# Patient Record
Sex: FEMALE
[De-identification: names, ages, dates, MRNs, and addresses within clinical notes are randomized per-mention and may not be internally consistent; named-entity substitution may affect disease eponyms.]

---

## 2019-02-19 ENCOUNTER — HOSPITAL ENCOUNTER (OUTPATIENT)
Dept: HOSPITAL 88 - OR | Age: 64
Discharge: HOME | End: 2019-02-19
Attending: OPHTHALMOLOGY
Payer: MEDICARE

## 2019-02-19 VITALS — DIASTOLIC BLOOD PRESSURE: 64 MMHG | SYSTOLIC BLOOD PRESSURE: 120 MMHG

## 2019-02-19 DIAGNOSIS — Z79.82: ICD-10-CM

## 2019-02-19 DIAGNOSIS — E78.5: ICD-10-CM

## 2019-02-19 DIAGNOSIS — E11.9: ICD-10-CM

## 2019-02-19 DIAGNOSIS — Z95.5: ICD-10-CM

## 2019-02-19 DIAGNOSIS — Z88.6: ICD-10-CM

## 2019-02-19 DIAGNOSIS — Z79.84: ICD-10-CM

## 2019-02-19 DIAGNOSIS — I25.10: ICD-10-CM

## 2019-02-19 DIAGNOSIS — H25.11: Primary | ICD-10-CM

## 2019-02-19 DIAGNOSIS — I10: ICD-10-CM

## 2019-02-19 PROCEDURE — 66984 XCAPSL CTRC RMVL W/O ECP: CPT

## 2019-02-19 PROCEDURE — 82948 REAGENT STRIP/BLOOD GLUCOSE: CPT

## 2019-02-19 PROCEDURE — 36415 COLL VENOUS BLD VENIPUNCTURE: CPT

## 2019-02-19 NOTE — XMS REPORT
Encounter Summary

                             Created on: 2019



Dipti Law

External Reference #: [id number]

: 1955

Sex: Female



Demographics







                          Address                   [address] [place]  [address]

 

                          Home Phone                [phone]

 

                          Preferred Language        English

 

                          Marital Status            

 

                          Christian Affiliation     Unknown

 

                          Race                      Unknown

 

                          Ethnic Group              /Latin





Author







                          Organization              Unknown

 

                          Address                   [address] [place]  [address]



 

                          Phone                     [phone]







Care Team Providers







                    Care Team Member Name    Role                Phone

 

                    Dr. Ute Jackson    3                   [phone]

 

                    Ute Jackson MD    [phone]                   [phone]

 

                    Best Gaspar MD    [phone]                  [phone]

 

                    Ankit Snyder MD    110                 [phone]

 

                    Surendra Mckeon MD    111                 [phone]

 

                    Surendra Jolly DPM    120                 [phone]

 

                    Blair Pandey MD    129                 [phone]



                                                      



Reason for Visit

          





                                        diabetic foot exam; pre-op evaluation



                                                                    



Instructions

          





                                        1. Pre-surgery evaluation

 

                                         electrocardiogram

 

                                         CBC w/ auto diff

 

                                        2. Depression screening

 

                                         learning about depression

 

                                        3. Screening mammography

 

                                         MAMMO, screening, digital, bilateral - PLEASE SCHEDULE & CONTACT OUR PATIENT. 

THANK YOU

 

                                         mammogram: about this test

 

                                        4. Screening for malignant neoplasm of colon

 

                                         fecal occult blood, stool

 

                                         colonoscopy referral - Please schedule & contact our patient. thank you/FX RESULTS

 [phone] Thank you

 

                                        5. Angina pectoris

 

                                        6. Peripheral vascular disease

 

                                        7. Type 2 diabetes mellitus

 

                                        8. Depressive disorder







Discussion Note: None recorded.                                                 
                                                



Plan of Care

                      





                Reminders                                                                    Provider 

               

 

                Appointments    Est Patient     2019 8:15AM    Shaw Roberson MD

 

                Lab             Fecal Occult Blood, Stool    2019      [facility]

 

                               CBC W/ Auto Diff    2019      [facility]

 

                Referral        Colonoscopy Referral    2019      Carlos Montemayor MD

 

                Procedures      None recorded.                   

 

                Surgeries       None recorded.                   

 

                Imaging         MAMMO, Screening, Digital, Bilateral    2019      [facility]

 

                               Electrocardiogram    2019      [facility] [place]



                                                                                
                                                         



Medications

                      





                    Name                      Start Date                                      

 

                                        amlodipine 10 mg tablet

 Take 1 tablet every day by oral route.    

 

                                        atorvastatin 10 mg tablet

 TAKE 1 TABLET BY MOUTH EVERY DAY       

 

                                        calcium

 TAKE ONE TABLET BY MOUTH ONCE A DAY    

 

                                        Longs Adult Low Strength ASA 81 mg tablet,delayed release

 Take 1 tablet every day by oral route.    

 

                                        metformin 500 mg tablet

 TAKE 1 TABLET BY MOUTH TWICE DAILY     

 

                                        metoprolol succinate ER 50 mg tablet,extended release 24 hr

 Take 1 tablet every day by oral route.    



                                                                                
                                                         



Medications Administered

          



  None recorded.                                                                
               



Vitals

          





                Height          Weight          BMI             Blood Pressure 

 

                 5 ft 1 in        158.6 lbs        30 kg/m2        136/70 mm[Hg]  



                                                                              



Lab Results

                      



              None recorded.                                                    
                                                



Allergies

          





          Code      Code System    Name      Reaction    Severity    Status    Onset

 

          2670      RxNorm    Codeine                      Active    05/15/2018



                                                                              



Problems

                      





                Name                      Status                      Onset Date                      

Source                                                  

 

                Dural Arteriovenous Malformation    Active          05/15/2018      External

 

                Type 2 Diabetes Mellitus    Active          2018      External

 

                Essential Hypertension    Active          2018      

 

                Depressive Disorder    Active          2018      

 

                History of Cerebrovascular Accident    Active          2018      



                                                                                
                                                



Procedures

                      





                Date                      Name                      Performed by                      

                

 

                    2011          Knee Surgery        Information not available

 

                    1986          Partial Hysterectomy    Information not available

 

                                       Other               Information not available

 

                                       Tonsilectomy/adenoids    Information not available

 

                    2019          MAMMO, Screening, Digital, Bilateral    [facility]

                                        [address] 

[place] [address] [phone] (Work Place)

 

                    2019          Electrocardiogram    [facility] [address] [address] [phone] ([facility] Place)



                                                                                
                                                          



Vaccine List

          





                                        Vaccine Type

 

                                        influenza, high dose seasonal

 

                                        10/30/80613.5 mL

 

                                        pneumococcal conjugate PCV 13

 

                                        10/30/48963.5 mL



                                                                                
        



Social History

          





                    Smoking Status      Never Smoker         



                                                                              



Past Encounters

                      





                                        2019

Pre-surgery Evaluation; Depression Screening; Screening Mammography; Screening 
for Malignant Neoplasm of Colon; Angina Pectoris; Peripheral Vascular Disease; 
Type 2 Diabetes Mellitus; Depressive Disorder

Shaw Roberson MD: [address] [place] [address], Ph. 
[phone]



                                                                                
        



History of Present Illness

          



Note:Coming for preoperative evaluation for bilateral cataract surgery. Hx of DM
 last a1c 6.7 (stable). No new concerns.                                        
                            



Review of Systems

                      





                                                   Comprehensive General Adult ROS

 

                          Reported By:              Patient

 

                          Constitutional:           Constitutional: no fever, no night sweats, no significant weight

 gain, no significant weight loss, no exercise intolerance

 

                          Eyes:                     Eyes: no dry eyes, no irritation, vision change

 

                          ENMT:                     Ears: no difficulty hearing, no ear pain. Nose: no frequent nosebleeds, no

 nose problems, no sinus problems. Mouth/Throat: no sore throat, no bleeding 
gums, no snoring, no dry mouth, no mouth ulcers, no oral abnormalities, no teeth
 problems

 

                          Cardiovascular:           Cardiovascular: no chest pain, no arm pain on exertion, no shortness

 of breath when walking, no shortness of breath when lying down, no 
palpitations, no known heart murmur, no lightheadedness

 

                          Respiratory:              Respiratory: no cough, no wheezing, no shortness of breath, no coughing

 up blood, no sleep apnea

 

                          Gastrointestinal:         Gastrointestinal: no abdominal pain, no nausea, no vomiting, 

no constipation, normal appetite, no diarrhea, not vomiting blood, no dyspepsia,
 no GERD

 

                          Genitourinary:            Genitourinary: no incontinence, no difficulty urinating, no hematuria,

 no increased frequency

 

                          Musculoskeletal:          Musculoskeletal: no muscle aches, no muscle weakness, no arthralgias/joint

 pain, no back pain, no swelling in the extremities

 

                          Integumentary:            Skin: no abnormal mole, no jaundice, no rashes, no laceration

 

                          Neurologic:               Neurologic: no loss of consciousness, no weakness, no numbness, no 

seizures, no dizziness, no migraines, no headaches, no tremor

 

                          Psychiatric:              Psych: no depression, no sleep disturbances, feeling safe in a relationship,

 no alcohol abuse, no anxiety, no hallucinations, no suicidal thoughts

 

                          Endocrine:                Endocrine: no fatigue

 

                          Hematologic/Lymphatic:    Hematologic/Lymphatic no swollen glands, no bruising, no

 excessive bleeding

 

                          Allergic/Immunologic:     Allergy/Immunologic: no runny nose, no sinus pressure, no

 itching, no hives, no frequent sneezing



                                                                              



Physical Exam

                      





                                                   General Adult Exam (male)

 

                          Reported By:              Patient

 

                          Constitutional:           General Appearance: healthy-appearing, obese. Level of Distress:

 NAD. Ambulation: ambulating normally

 

                          Psychiatric:              Insight: good judgement. Mental Status: active and alert, normal mood,

 normal affect. Orientation: to time, to place, to person. Memory: recent memory
 normal, remote memory normal

 

                          Head:                     Head: normocephalic, atraumatic

 

                          Eyes:                     Lids and Conjunctivae: non-injected, no discharge. Pupils: PERRLA. EOM: EOMI.

 Sclerae: non-icteric

 

                          ENMT:                     Ears: TMs clear. Nose: no sinus tenderness. Lips, Teeth, and Gums: no mouth

 or lip ulcers. Oropharynx: moist mucous membranes

 

                          Neck:                     Neck: supple, trachea midline. Thyroid: no enlargement, non-tender

 

                          Lungs:                    Auscultation: breath sounds normal

 

                          Cardiovascular:           Heart Auscultation: RRR, normal S1, normal S2, no murmurs. Neck

 vessels: no carotid bruits. Pulses including femoral / pedal: normal throughout

 

                          Neurologic:               Gait and Station: normal gait. Cranial Nerves: grossly intact

 

                          Skin:                     Inspection and palpation: no rash, no lesions

## 2019-02-19 NOTE — XMS REPORT
Clinical Summary

                             Created on: 2019



Dipti Law

External Reference #: [id number]

: 1955

Sex: Female



Demographics







                          Address                   [address] [place]  [address]

 

                          Home Phone                [phone]

 

                          Preferred Language        English

 

                          Marital Status            

 

                          Druze Affiliation     [id number]

 

                          Race                      White

 

                          Ethnic Group              /Latin





Author







                          Author                    [facility]

 

                          Organization              [facility]

 

                          Address                   Unknown

 

                          Phone                     Unavailable







Support







                Name            Relationship    Address         Phone

 

                    Hitesh Law        [address]                [address] [address]

[place]  [address]                      [phone]







Care Team Providers







                    Care Team Member Name    Role                Phone

 

                    MiltonMarcos    PCP                 [phone]







Allergies







                                        Comments



                 Active Allergy     Reactions       Severity        Noted Date 

 

                                        



Heart beat changes



                           Codeine                   05/15/2018 







Medications







                          End Date                  Status



              Medication     Sig          Dispensed     Refills      Start  



                                         Date  

 

                                                    Active



                     amLODIPine (NORVASC) 10     amlodipine 10       0   



                           mg tablet                 mg tablet     



                                         Take 1     



                                         tablet every     



                                         day by oral     



                                         route.     

 

                                                    Active



                     atorvastatin (LIPITOR) 10     atorvastatin        0   



                           MG tablet                 10 mg tablet     



                                         TAKE 1     



                                         TABLET BY     



                                         MOUTH EVERY     



                                         DAY     

 

                                                    Active



                     calcium carbonate     TAKE ONE            0   



                           (CALCIUM 500 ORAL)        TABLET BY     



                                         MOUTH ONCE A     



                                         DAY     

 

                                                    Active



                     metoprolol succinate XL     Take 1 tablet       0   



                           (TOPROL-XL) 50 mg 24 hr     every day by     



                           tablet                    oral route.     

 

                                                    Active



                     metFORMIN (GLUCOPHAGE)     metformin 500       0   



                           500 mg tablet             mg tablet     



                                         TAKE 1     



                                         TABLET BY     



                                         MOUTH TWICE     



                                         DAILY     

 

                          05/15/2018                Discontinued



                     amLODIPine (NORVASC) 10     Take 1 tablet       0   



                           mg tablet                 every day by     



                                         oral route.     

 

                          05/15/2018                Discontinued



                     atorvastatin (LIPITOR) 10     TAKE 1 TABLET       0   



                           MG tablet                 BY MOUTH     



                                         EVERY DAY     

 

                          05/15/2018                Discontinued



                     calcium carbonate     calcium             0   



                           (CALCIUM 500 ORAL)        TAKE ONE     



                                         TABLET BY     



                                         MOUTH ONCE A     



                                         DAY     

 

                          05/15/2018                Discontinued



                     metoprolol succinate XL     metoprolol          0   



                           (TOPROL-XL) 50 mg 24 hr     succinate ER     



                           tablet                    50 mg     



                                         tablet,extend     



                                         ed release 24     



                                         hr     



                                         Take 1     



                                         tablet every     



                                         day by oral     



                                         route.     

 

                          05/15/2018                Discontinued



                     metFORMIN (GLUCOPHAGE)     TAKE 1 TABLET       0   



                           500 mg tablet             BY MOUTH     



                                         TWICE DAILY     

 

                          2018                



              methylPREDNISolone     follow       21 tablet     0              



                     (MEDROL, TOM,) 4 mg     package             8  



                           tablet                    directions     







Active Problems







 



                           Problem                   Noted Date

 

 



                           Type 2 diabetes mellitus with hyperglycemia, without long-term current use     2018





                                         of insulin 

 

 



                           Essential hypertension     2018

 

 



                           Dural arteriovenous fistula     05/15/2018







Encounters







                          Care Team                 Description



                     Date                Type                Specialty  

 

                                        



Sotero Man MD Bhardwaj, Sergio Mccloud MD                 Dural arteriovenous fistula (Primary Dx); 

Arteriovenous fistula, acquired; 

Type 2 diabetes mellitus with hyperglycemia, without long-term current use of 
insulin



                     05/15/2018          Hospital            Neurosurgery  



                           -                         Encounter   



                                         2018    

 

                                        



Brice Dalton MD                C-C fistula



                     05/15/2018          Hospital            Radiology  



                                         Encounter   

 

                                        



Janna Abbott MD                    



                     05/15/2018          Anesthesia          Radiology  



                                         Event   

 

                                        



Sotero Man MD               Arteriovenous fistula, acquired (Primary Dx)



                     05/15/2018          Transcribe          Radiology  



                                         Orders   

 

                                        



Anita Piper MD                      



                     2018          Anesthesia          Radiology  



                                         Event   

 

                                        



Brice Dalton MD                Carotid-cavernous fistula



                     2018          Hospital            Radiology  



                                         Encounter   

 

                                        



Brice Dalton MD                Carotid-cavernous fistula (Primary Dx)



                     2018          Transcribe          Radiology  



                                         Orders   

 

                                        



Patrick Grimes, DO                      Pain in right eye



                     2018          Hospital            Radiology  



                                         Encounter   

 

                                        



Patrick Grimes, DO                      Pain in right eye (Primary Dx)



                     2018          Transcribe          Access  



                                         Orders   



after 2018



Social History







                                        Date



                 Tobacco Use     Types           Packs/Day       Years Used 

 

                                         



                                         Never Smoker    

 

    



                                         Smokeless Tobacco: Never   



                                         Used   









   



                 Alcohol Use     Drinks/Week     oz/Week         Comments

 

   



                           Yes                       occasionally









 



                           Sex Assigned at Birth     Date Recorded

 

 



                                         Not on file 









                                        Industry



                           Job Start Date            Occupation 

 

                                        Not on file



                           Not on file               Not on file 









                                        Travel End



                           Travel History            Travel Start 

 





                                         No recent travel history available.







Last Filed Vital Signs







                                        Time Taken



                           Vital Sign                Reading 

 

                                        2018 12:39 PM CDT



                           Blood Pressure            156/69 

 

                                        2018 12:39 PM CDT



                           Pulse                     58 

 

                                        2018 12:39 PM CDT



                           Temperature               36.4 C (97.6 F) 

 

                                        2018 12:39 PM CDT



                           Respiratory Rate          18 

 

                                        2018 12:39 PM CDT



                           Oxygen Saturation         95% 

 

                                        -



                           Inhaled Oxygen            - 



                                         Concentration  

 

                                        05/15/2018 11:22 AM CDT



                           Weight                    76.2 kg (168 lb) 

 

                                        05/15/2018 11:22 AM CDT



                           Height                    154.9 cm (5' 1") 

 

                                        05/15/2018 11:22 AM CDT



                           Body Mass Index           31.74 







Plan of Treatment







   



                 Health Maintenance     Due Date        Last Done       Comments

 

   



                           DIABETIC RETINAL EYE EXAM     1955  

 

   



                           DIABETIC FOOT EXAM        1963  

 

   



                           URINE MICROALBUMIN        1963  

 

   



                           CERVICAL CANCER SCREENING     1974  

 

   



                           BREAST CANCER SCREENING     2003  

 

   



                           COLON CANCER SCREENING     2003  

 

   



                           SHINGLES VACCINES (1 of     2003  



                                         2)   

 

   



                           PNEUMOCOCCAL              2018  



                                         POLYSACCHARIDE VACCINE   



                                         AGE 65 AND OVER   

 

   



                           PNEUMOCOCCAL-13           2018  

 

   



                           INFLUENZA VACCINE         2018  







Implants







                    Device Identifier    Shelf Expiration Date    Model / Serial / Lot



                 Implanted       Type            Area            Manufactur   



                                         er   

 

                                        2019          [id number] /

 /

                                        [id number]



                     Device Vasclr Clsr Vasoactive     Cardiovasc          N/A: N/A    



                           Intstnl Peptd 6fr Angio-Seal -     ular     



                           [id number]                Implants     



                                         Implanted: 2018 (Quantity not      



                                         on file)      

 

                                        2019          [id number] /

 /

                                        [id number]



                     Device Vasclr Clsr Vasoactive     Cardiovasc          N/A: N/A    



                           Intstnl Peptd 6fr Angio-Seal -     ular     



                           [id number]                Implants     



                                         Implanted: 05/15/2018 (Quantity not      



                                         on file)      

 

                                                            [id number] /

 /





                 Catheter Angio Imager Ii 5fr 100cm     Surgical        N/A: N/A        [facility] -     Implants;           PERIPHERAL   



                     [id number]          Expanders;          INTERVENTI   



                     Implanted: 2018 (Quantity not     Extenders;          ON   



                     on file)            Surgical            VASCULAR   



                           Wires                     RUBIN   

 

                                                            [id number] /

 /





                 Catheter Thrmbtmy Neuron Max 088     Surgical        N/A: N/A        PENUMBRA   



                     Str 6fr 80x4cm - [id number]     Implants;           INC   



                           Implanted: 05/15/2018 (Quantity not     Expanders;     



                           on file)                  Extenders;     



                                         Surgical     



                                         Wires     

 

                                                            [id number] /

 /





                 Catheter Angiography Diag Barnh     Surgical        N/A: N/A        [facility]   



                     Torque Imager Ii 5fr 100cm -     Implants;           PERIPHERAL   



                     [id number]          Expanders;          INTERVENTI   



                     Implanted: 05/15/2018 (Quantity not     Extenders;          ON   



                     on file)            Surgical            VASCULAR   



                           Wires                     RUBIN   

 

                                                            [id number] /

 /





                 Catheter Neurvas Gassaway 10 1lmn     Surgical        N/A: N/A        [employer]   



                     Renfrcd 2.1-1.7fr Micr - [id number]     Implants;           [place] -   



                     Implanted: 05/15/2018 (Quantity not     Expanders;          VASCULAR   



                           on file)                  Extenders;     



                                         Surgical     



                                         Wires     

 

                                                            FC 12 30 3D /

 /





                 Coil Axium [id number] Detachable     Vascular        N/A: N/A        [employer]   



                     (Frame) 12 X 30 - [id number]     Coil                [place] -   



                           Implanted: 05/15/2018 (Quantity not       VASCULAR   



                                         on file)      

 

                                                            FC 10 40 3D /

 /





                 Coil Axium [id number] Detachable     Vascular        N/A: N/A        [employer]   



                     (Frame) 10 X 40 - [id number]     Coil                [place] -   



                           Implanted: 05/15/2018 (Quantity not       VASCULAR   



                                         on file)      







Procedures







                                        Comments



                 Procedure Name     Priority        Date/Time       Associated Diagnosis 

 

                                        



Results for this procedure are in the results section.



                     POC GLUCOSE         Routine             2018  



                                         1:44 PM CDT  

 

                                        



Results for this procedure are in the results section.



                     POC GLUCOSE         Routine             2018  



                                         7:39 AM CDT  

 

                                        



Results for this procedure are in the results section.



                     POC GLUCOSE         Routine             2018  



                                         12:13 AM CDT  

 

                                        



Results for this procedure are in the results section.



                     POC GLUCOSE         Routine             2018  



                                         7:33 PM CDT  

 

                                        



Results for this procedure are in the results section.



                     POC GLUCOSE         Routine             2018  



                                         3:38 PM CDT  

 

                                        



Results for this procedure are in the results section.



                     POC GLUCOSE         Routine             2018  



                                         11:34 AM CDT  

 

                                        



Results for this procedure are in the results section.



                     POC GLUCOSE         Routine             2018  



                                         7:25 AM CDT  

 

                                        



Results for this procedure are in the results section.



                     POC GLUCOSE         Routine             2018  



                                         4:35 AM CDT  

 

                                        



Results for this procedure are in the results section.



                     ZZESTIMATED GFR     Routine             2018  



                                         12:44 AM CDT  

 

                                        



Results for this procedure are in the results section.



                     IONIZED CALCIUM     Routine             2018  



                                         12:44 AM CDT  

 

                                        



Results for this procedure are in the results section.



                     BASIC METABOLIC PANEL     Routine             2018  



                                         12:44 AM CDT  

 

                                        



Results for this procedure are in the results section.



                     PHOSPHORUS LEVEL     Routine             2018  



                                         12:44 AM CDT  

 

                                        



Results for this procedure are in the results section.



                     MAGNESIUM LEVEL     Routine             2018  



                                         12:44 AM CDT  

 

                                        



Results for this procedure are in the results section.



                     HC COMPLETE BLD COUNT     Routine             2018  



                           W/AUTO DIFF               12:35 AM CDT  

 

                                        



Results for this procedure are in the results section.



                     POC GLUCOSE         Routine             2018  



                                         12:04 AM CDT  

 

                                        



Results for this procedure are in the results section.



                     POC GLUCOSE         Routine             05/15/2018  



                                         10:35 PM CDT  

 

                                        



Results for this procedure are in the results section.



                     POC GLUCOSE         Routine             05/15/2018  



                                         8:53 PM CDT  

 

                                        



Results for this procedure are in the results section.



                 IR ANGIOGRAM CEREBRAL     Routine         05/15/2018      C-C fistula 



                           RIGHT                     4:41 PM CDT  

 

                                        



Results for this procedure are in the results section.



                     IR 3D RECON SLICES     Routine             05/15/2018  



                           SNAPSHOTS RDMPS           4:40 PM CDT  

 

                                        



Results for this procedure are in the results section.



                 IR EMBOLIZATION ARTERIAL     Routine         05/15/2018      Arteriovenous fistula, 



                     NEURO               4:40 PM CDT         acquired 

 

                                         



                     ARTERIAL LINE       Routine             05/15/2018  



                                         1:30 PM CDT  

 

  



                                         Procedure



                                         Note -



                                         Janna Abbott MD -



                                         05/15/2018



                                         1:30 PM



                                         CDT



                                         Arterial



                                         line



                                         Performed



                                         by:



                                         JANNA ABBOTT



                                         Authorized



                                         by:



                                         JANNA ABBOTT





                                         Patient



                                         Location:



                                         OR



                                         Start



                                         Time:



                                         5/15/2018



                                         1:30 PM



                                         End Time:



                                         5/15/2018



                                         1:30 PM



                                         Staff:



                                         Kodio



                                         shana:



                                         JANNA ABBOTT



                                         Performed



                                         by:



                                         Kandis saldana



                                         Pre-proced



                                         ure:



                                         patient



                                         identified



                                         , IV



                                         checked,



                                         site and



                                         side



                                         verified,



                                         risks and



                                         benefits



                                         discussed,



                                         procedure



                                         verified,



                                         surgical



                                         consent



                                         complete,



                                         patient



                                         position



                                         confirmed,



                                         monitors



                                         and



                                         equipment



                                         checked



                                         and pre-op



                                         evaluation



                                         complete



                                         MSBT:



                                         antiseptic



                                         used, all



                                         elements



                                         of maximal



                                         sterile



                                         barrier



                                         technique



                                         followed,



                                         hand



                                         hygiene



                                         performed,



                                         cap/gown



                                         used by



                                         other



                                         personnel



                                         and



                                         solutions



                                         labeled



                                         Indication



                                         s:



                                         Indication



                                         s:



                                         hemodynami



                                         c



                                         monitoring



                                         Anesthesia



                                         :



                                         Anesthesia



                                         :  General



                                         Procedure



                                         Details:



                                         Arterial



                                         Line



                                         placement:



                                         Placed



                                         post



                                         induction



                                         Line



                                         placement



                                         site:



                                         Radial



                                         Line



                                         placement



                                         side:



                                         Left



                                         Arterial



                                         line



                                         gauge:  20



                                         G



                                         Number of



                                         attempts:



                                         1



                                         Ultrasound



                                         guidance



                                         used: No



                                         Post-proce



                                         dure:



                                         Post-proce



                                         dure:



                                         Sterile



                                         dressing



                                         applied



                                         Post



                                         procedure



                                         circulatio



                                         n,



                                         sensation,



                                         movement:



                                         Normal



                                         Patient



                                         tolerance:



                                         Patient



                                         tolerated



                                         the



                                         procedure



                                         well with



                                         no



                                         immediate



                                         complicati



                                         ons



 

                                         



                     IL AN ELECTIVE      Routine             05/15/2018  



                           ENDOTRACHEAL AIRWAY       1:29 PM CDT  

 

  



                                         Procedure



                                         Note -



                                         Janna Abbott MD -



                                         05/15/2018



                                         1:29 PM



                                         CDT



                                         Airway



                                         Date/Time:



                                         5/15/2018



                                         1:29 PM



                                         Performed



                                         by:



                                         JANNA ABBOTT



                                         Authorized



                                         by:



                                         JANNA ABBOTT





                                         Location:



                                         OR



                                         Urgency:



                                         Elective



                                         Difficult



                                         Airway: No



                                         Anesthesio



                                         logist:



                                         JANNA ABBOTT



                                         Performed



                                         by:



                                         anesthesiodalis saldana



                                         Preoxygena



                                         amna with



                                         100% O2:



                                         Yes



                                         C-spine



                                         Precaution



                                         s



                                         Maintained



                                         Throughout



                                         : Yes



                                         Mask



                                         Ventilatio



                                         n:  Easy



                                         mask



                                         Final



                                         Airway



                                         Type:



                                         Endotrache



                                         al airway



                                         Final



                                         Endotrache



                                         al Airway:



                                         ETT



                                         Cuffed:



                                         Yes



                                         Technique



                                         Used:



                                         Direct



                                         laryngosco



                                         py



                                         Devices/Me



                                         thods Used



                                         in



                                         Placement:



                                         Intubatin



                                         g stylet



                                         Insertion



                                         Site:



                                         Oral



                                         Blade



                                         Type:



                                         Coto



                                         Laryngosco



                                         pe



                                         Blade/Vide



                                         olaryngosc



                                         ope Blade



                                         Size:  2



                                         ETT Size



                                         (mm):  7.0



                                         Cuff at



                                         minimum



                                         occlusion



                                         pressure:



                                         Yes



                                         Measured



                                         from:



                                         Teeth



                                         ETT to



                                         Teeth



                                         (cm):  21



                                         Placement



                                         Verified



                                         by: CO2



                                         detection,



                                         direct



                                         visualizat



                                         ion and



                                         equal



                                         breath



                                         sounds



                                         Laryngosco



                                         pic view:



                                         Grade I -



                                         full view



                                         of glottis



                                         Rapid



                                         Sequence



                                         Induction



                                         (RSI): No





                                         Modified



                                         RSI: No



                                         Number of



                                         Attempts



                                         at



                                         Approach:



                                         1



                                         TOOTH



                                         PROTECTOR.



                                         DENTITION



                                         UNCHANGED.



 

                                        



Results for this procedure are in the results section.



                     POC GLUCOSE         Routine             05/15/2018  



                                         10:46 AM CDT  

 

                                        



Results for this procedure are in the results section.



                     POC GLUCOSE         Routine             2018  



                                         1:02 PM CDT  

 

                                        



Results for this procedure are in the results section.



                     IR 3D RECON SLICES     Routine             2018  



                           SNAPSHOTS RDMPS           12:55 PM CDT  

 

                                        



Results for this procedure are in the results section.



                 IR ANGIOGRAM CEREBRAL     Routine         2018      Carotid-cavernous fistula 



                           BILATERAL                 12:55 PM CDT  

 

                                         



                     IL AN ELECTIVE      Routine             2018  



                           ENDOTRACHEAL AIRWAY       11:35 AM CDT  

 

  



                                         Procedure



                                         Note -



                                         Anita Piper MD -



                                         2018



                                         11:35 AM



                                         CDT



                                         Airway



                                         Date/Time:



                                         2018



                                         11:26 AM



                                         Performed



                                         by: ANITA PIPER



                                         Authorized



                                         by: ANITA PIPER





                                         Location:



                                         OR



                                         Urgency:



                                         Elective



                                         Difficult



                                         Airway: No



                                         Preoxygena



                                         amna with



                                         100% O2:



                                         Yes



                                         C-spine



                                         Precaution



                                         s



                                         Maintained



                                         Throughout



                                         : Yes



                                         Mask



                                         Ventilatio



                                         n:



                                         Assisted



                                         mask



                                         Final



                                         Airway



                                         Type:



                                         Endotrache



                                         al airway



                                         Final



                                         Endotrache



                                         al Airway:



                                         ETT



                                         Cuffed:



                                         Yes



                                         Technique



                                         Used:



                                         Direct



                                         laryngosco



                                         py



                                         Devices/Me



                                         thods Used



                                         in



                                         Placement:



                                         Intubatin



                                         g stylet



                                         Insertion



                                         Site:



                                         Oral



                                         Blade



                                         Type:



                                         Coto



                                         Laryngosco



                                         pe



                                         Blade/Vide



                                         olaryngosc



                                         ope Blade



                                         Size:  2



                                         ETT Size



                                         (mm):  7.0



                                         Cuff at



                                         minimum



                                         occlusion



                                         pressure:



                                         Yes



                                         Measured



                                         from:



                                         Lips



                                         ETT to



                                         Lips (cm):



                                         23



                                         Placement



                                         Verified



                                         by: CO2



                                         detection,



                                         direct



                                         visualizat



                                         ion and



                                         equal



                                         breath



                                         sounds



                                         Laryngosco



                                         pic view:



                                         Grade IIa



                                         - partial



                                         view of



                                         glottis



                                         Rapid



                                         Sequence



                                         Induction



                                         (RSI): No





                                         Modified



                                         RSI: No



                                         Number of



                                         Attempts



                                         at



                                         Approach:



                                         1



 

                                        



Results for this procedure are in the results section.



                     POC GLUCOSE         Routine             2018  



                                         9:17 AM CDT  

 

                                        



Results for this procedure are in the results section.



                 CT ANGIOGRAM HEAD W WO     Routine         2018      Pain in right eye 



                           CONTRAST                  6:00 PM CDT  

 

                                        



Results for this procedure are in the results section.



                     POC CREATININE      Routine             2018  



                                         5:33 PM CDT  



after 2018



Results

* POC glucose (2018  1:44 PM CDT)



Only the most recent of 14 results within the time period is included.





   



                 Component       Value           Ref Range       Performed At

 

   



                 POC glucose     152 (H)         65 - 99 mg/dL     [facility] DEPARTMENT OF



                           Comment:                  PATHOLOGY AND



                           [facility] Notified RN           GENOMIC MEDICINE



                                         Meter ID: [id number]  



                                         : María Elena Sauer  









   



                 Performing Organization     Address         [facility]     Phone Number

 

   



                     [facility] [place] [address] 



                                         PATHOLOGY AND GENOMIC   



                                         MEDICINE   





* Estimated GFR (2018 12:44 AM CDT)





   



                 Component       Value           Ref Range       Performed At

 

   



                 GFR Non Af Amer     >90             mL/min/1.73 m2     [facility] DEPARTMENT OF



                                         PATHOLOGY AND



                                         GENOMIC MEDICINE

 

   



                 GFR Af Amer     >90             mL/min/1.73 m2     [facility] DEPARTMENT OF



                           Comment:                  PATHOLOGY AND



                           Chronic kidney disease: <60      GENOMIC MEDICINE



                                         mL/min/1.73m2  



                                         Kidney failure: <15  



                                         mL/min/1.73m2  



                                         The estimated GFR is  



                                         calculated from the  



                                         IDMS-traceable Modification of  



                                         Diet  



                                         in Renal Disease Equation. The  



                                         accuracy of the calculation is  



                                         poor when the  



                                         creatinine is normal.  



                                         Calculated values >90  



                                         mL/min/1.73m2 are not  



                                         reported.  



                                         This equation has not been  



                                         validated in children (<18  



                                         years), pregnant  



                                         women, the elderly (>70  



                                         years), or ethnic groups other  



                                         than Caucasians and  



                                          Americans.  













                                         Specimen

 





                                         Plasma specimen









   



                 Performing Organization     Address         [facility]     Phone Number

 

   



                     [facility] DEPARTMENT OF     [address] [place] [address] 



                                         PATHOLOGY AND Distractify   



                                         MEDICINE   





* Phosphorus level (2018 12:44 AM CDT)





   



                 Component       Value           Ref Range       Performed At

 

   



                 Phosphorus      3.2             2.4 - 4.5 mg/dL     [facility] DEPARTMENT OF



                                         PATHOLOGY AND



                                         GENOMIC MEDICINE













                                         Specimen

 





                                         Plasma specimen









   



                 Performing Organization     Address         [facility]     Phone Number

 

   



                     [place] [address] 



                                         PATHOLOGY AND GENOMIC   



                                         MEDICINE   





* Magnesium level (2018 12:44 AM CDT)





   



                 Component       Value           Ref Range       Performed At

 

   



                 Magnesium       1.8             1.6 - 2.4 mg/dL     [facility] DEPARTMENT OF



                                         PATHOLOGY AND



                                         GENOMIC MEDICINE













                                         Specimen

 





                                         Plasma specimen









   



                 Performing Organization     Address         [facility]     Phone Number

 

   



                     [place] [address] 



                                         PATHOLOGY AND GENOMIC   



                                         MEDICINE   





* Ionized calcium (2018 12:44 AM CDT)





   



                 Component       Value           Ref Range       Performed At

 

   



                     pH                  7.62                [facility] DEPARTMENT OF



                                         PATHOLOGY AND



                                         GENOMIC MEDICINE

 

   



                 Ionized calcium     1.06 (L)        1.11 - 1.32 mmol/L     [facility] DEPARTMENT OF



                                         PATHOLOGY AND



                                         GENOMIC MEDICINE













                                         Specimen

 





                                         Plasma specimen









   



                 Performing Organization     Address         [facility]     Phone Number

 

   



                     [place] [address] 



                                         PATHOLOGY AND GENOMIC   



                                         MEDICINE   





* Basic metabolic panel (2018 12:44 AM CDT)





   



                 Component       Value           Ref Range       Performed At

 

   



                 Sodium          140             135 - 148 mEq/L     [facility] DEPARTMENT OF



                                         PATHOLOGY AND



                                         GENOMIC MEDICINE

 

   



                 Potassium       3.6             3.5 - 5.0 mEq/L     [facility] DEPARTMENT OF



                                         PATHOLOGY AND



                                         GENOMIC MEDICINE

 

   



                 Chloride        105             98 - 112 mEq/L     [facility] DEPARTMENT OF



                                         PATHOLOGY AND



                                         GENOMIC MEDICINE

 

   



                 CO2             21 (L)          24 - 31 mEq/L     [facility] DEPARTMENT OF



                                         PATHOLOGY AND



                                         GENOMIC MEDICINE

 

   



                 Anion gap       14@ANIO         7 - 15 mEq/L     [facility] DEPARTMENT OF



                                         PATHOLOGY AND



                                         GENOMIC MEDICINE

 

   



                 BUN             6 (L)           8 - 23 mg/dL     [facility] DEPARTMENT OF



                                         PATHOLOGY AND



                                         GENOMIC MEDICINE

 

   



                 Creatinine      0.5             0.5 - 0.9 mg/dL     [facility] DEPARTMENT OF



                                         PATHOLOGY AND



                                         GENOMIC MEDICINE

 

   



                 Glucose         203 (H)         65 - 99 mg/dL     [facility] DEPARTMENT OF



                                         PATHOLOGY AND



                                         GENOMIC MEDICINE

 

   



                 Calcium         8.5 (L)         8.8 - 10.2 mg/dL     [facility] DEPARTMENT OF



                                         PATHOLOGY AND



                                         GENOMIC MEDICINE













                                         Specimen

 





                                         Plasma specimen









   



                 Performing Organization     Address         City/[facility]     Phone Number

 

   



                     [place] [address] 



                                         PATHOLOGY AND GENOMIC   



                                         MEDICINE   





* CBC with platelet and differential (2018 12:35 AM CDT)





   



                 Component       Value           Ref Range       Performed At

 

   



                 WBC             9.59            4.50 - 11.00 k/uL     [facility] DEPARTMENT OF



                                         PATHOLOGY AND



                                         GENOMIC MEDICINE

 

   



                 RBC             4.74            4.20 - 5.50 m/uL     [facility] DEPARTMENT OF



                                         PATHOLOGY AND



                                         GENOMIC MEDICINE

 

   



                 HGB             13.8            12.0 - 16.0 g/dL     [facility] DEPARTMENT OF



                                         PATHOLOGY AND



                                         GENOMIC MEDICINE

 

   



                 HCT             40.8            37.0 - 47.0 %     [facility] DEPARTMENT OF



                                         PATHOLOGY AND



                                         GENOMIC MEDICINE

 

   



                 MCV             86.1            82.0 - 100.0 fL     [facility] DEPARTMENT OF



                                         PATHOLOGY AND



                                         GENOMIC MEDICINE

 

   



                 MCH             29.1            27.0 - 34.0 pg     [facility] DEPARTMENT OF



                                         PATHOLOGY AND



                                         GENOMIC MEDICINE

 

   



                 MCHC            33.8            31.0 - 37.0 g/dL     [facility] DEPARTMENT OF



                                         PATHOLOGY AND



                                         GENOMIC MEDICINE

 

   



                 RDW - SD        41.1            37.0 - 55.0 fL     [facility] DEPARTMENT OF



                                         PATHOLOGY AND



                                         GENOMIC MEDICINE

 

   



                 MPV             10.1            8.8 - 13.2 fL     [facility] DEPARTMENT OF



                                         PATHOLOGY AND



                                         GENOMIC MEDICINE

 

   



                 Platelet count     247             150 - 400 k/uL     [facility] DEPARTMENT OF



                                         PATHOLOGY AND



                                         GENOMIC MEDICINE

 

   



                 Nucleated RBC     0.00            /100 WBC        [facility] DEPARTMENT OF



                                         PATHOLOGY AND



                                         GENOMIC MEDICINE

 

   



                 Neutrophils     92.3 (H)        39.0 - 69.0 %     [facility] DEPARTMENT OF



                                         PATHOLOGY AND



                                         GENOMIC MEDICINE

 

   



                 Lymphocytes     6.8 (L)         25.0 - 45.0 %     [facility] DEPARTMENT OF



                                         PATHOLOGY AND



                                         GENOMIC MEDICINE

 

   



                 Monocytes       0.4             0.0 - 10.0 %     [facility] DEPARTMENT OF



                                         PATHOLOGY AND



                                         GENOMIC MEDICINE

 

   



                 Eosinophils     0.0             0.0 - 5.0 %     [facility] DEPARTMENT OF



                                         PATHOLOGY AND



                                         GENOMIC MEDICINE

 

   



                 Basophils       0.1             0.0 - 1.0 %     [facility] DEPARTMENT OF



                                         PATHOLOGY AND



                                         GENOMIC MEDICINE

 

   



                 Immature granulocytes     0.4Comment: "Immature     0.0 - 1.0 %     [facility] DEPARTMENT OF





                           granulocytes"  (promyelocytes,      PATHOLOGY AND



                           myelocytes, metamyelocytes)      GENOMIC MEDICINE













                                         Specimen

 





                                         Blood









   



                 Performing Organization     Address         City/State/Zipcode     Phone Number

 

   



                     [facility] DEPARTMENT OF     [address] [place] [address] 



                                         PATHOLOGY AND GENOMIC   



                                         MEDICINE   





* IR Angiogram Cerebral Right (05/15/2018  4:41 PM CDT)





 



                           Narrative                 Performed At

 

 



                           CEREBRAL DIGITAL SUBTRACTION ANGIOGRAPHY (DSA)      [facility]



                                         RADIATION EXPOSURE: 5,844 mGy (total radiation exposure for diagnostic and 



                                         embolization procedures). 



                                         COMPARISON: Brain CTA dated May 4, 2018; diagnostic cerebral DSA May 11, 2018. 





                                         CLINICAL HISTORY: 62-year-old female with right ocular pain, proptosis and 



                                         chemosis. Patient underwent brain CTA on May 4, 2018, showing asymmetric 



                                         enlargement of the superior ophthalmic vein on the right side and asymmetric 



                                         filling of the right 



                                         cavernous sinus. The patient underwent diagnostic cerebral angiography on May 



                                         11, 2018. The patient is here today for embolization. 



                                         TECHNIQUE: 



                                         The risk and benefits of the procedure were carefully discussed with and 



                                         explained to the patient. The risks of the procedure include, but are not 



                                         limited to, infection, hematoma, damage to any catheterized vessel, renal 



                                         failure, stroke, worsening of the 



                                         patient's neurological condition and contrast reaction. 



                                         Informed consent was obtained. Under general anesthesia provided by the 



                                         Anesthesiology service and utilizing a left femoral percutaneous approach, using

 



                                         micropuncture technique, the following procedures were performed: 



                                         1. Right common carotid artery injection, neck examination, in biplane 



                                         projections. 



                                         2. Selective right internal carotid injection, cerebral examination, in biplane,

 



                                         obliques and magnification views. 



                                         3. Selective right external carotid injection, cerebral examination, in biplane

 



                                         projections. 



                                         4. Selective injection of the right occipital artery in biplane projections. 



                                         5. Selective injection of the right ascending pharyngeal artery, in biplane 



                                         projections. 



                                         6. Rotational angiography ([place] CT) with selective injection of the right 



                                         ascending pharyngeal artery, followed by 3-D and multiplanar reconstructions 



                                         using a separate, dedicated workstation. 



                                         7. Rotational angiography (3D imaging) with selective injection of the right 



                                         ascending pharyngeal artery, followed by 3-D and multiplanar reconstructions 



                                         using a separate, dedicated workstation. 



                                         FINDINGS: 



                                         1. Selective right common carotid injection, neck examination, shows a patent 



                                         carotid bifurcation, without focal stenosis. There is a dural AV fistula 



                                         supplied by the neuromeningeal trunk of the ascending pharyngeal artery. The 



                                         shunt is located in the 



                                         right hypoglossal canal. There is retrograde filling of the right sphenoparietal

 



                                         sinus, cavernous sinus and superior ophthalmic vein. 



                                         2. Selective right internal carotid injection, cerebral examination, shows 



                                         normal antegrade flow intracranially into the anterior and middle cerebral 



                                         arteries. There is some cross-filling through the anterior communicating artery.

 



                                         No arteriovenous 



                                         shunting is seen in this injection. No aneurysm or focal stenosis is 



                                         identified.The venous phase is within normal limits. 



                                         3. Selective right external carotid injection, head examination, with the tip of

 



                                         the catheter just distal to the takeoff of the ascending pharyngeal artery, 



                                         shows faint opacification of the dural AV fistula, as well as retrograde filling

 



                                         of the 



                                         sphenoparietal sinus, cavernous sinus and superior ophthalmic vein. 



                                         4. Selective right occipital artery injection, head examination, shows small 



                                         contribution to the AV fistula by transmastoid branches of the occipital artery.

 



                                         5. Selective injection of the right ascending pharyngeal artery, head 



                                         examination, centered over the skull base, shows a dural AV fistula supplied by

 



                                         branches of the neural meningeal trunk, centered in the hypoglossal canal, with

 



                                         retrograde filling of 



                                         the sphenoparietal sinus, cavernous sinus, superior ophthalmic vein and orbital

 



                                         vein on the right side. The fistulous pouch measures approximately 9 mm in 



                                         height x 14 mm in AP diameter. 



                                         6. [place] CT was performed with selective injection of the right ascending 



                                         pharyngeal artery. Reconstructed images show the fistulous pouch located within

 



                                         the right hypoglossal canal. The images obtained from the [place] CT were used to 





                                         delineate the venous 



                                         anatomy in this region, for catheter navigation. 



                                         7. 3D imaging was also performed with selective injection of the right ascending

 



                                         pharyngeal artery. The reconstructed images depict in better detail the venous 





                                         anatomy in the region of the right hypoglossal canal. 



                                         Following the diagnostic portion of the examination, which was used for 



                                         operative/embolization planning, the embolization procedure was commenced. This

 



                                         is dictated under a separate report. 



                                         IMPRESSION: 



                                         1. 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 





                                         by the neuromeningeal trunk of the right ascending pharyngeal artery. 



                                         2. No immediate complications were encountered. 



                                         [facility]-[id number] 









                                        Procedure Note

 

                                        



[facility], Radiology Results Incoming - 05/15/2018  5:56 PM CDT



CEREBRAL DIGITAL SUBTRACTION ANGIOGRAPHY (DSA)

 

RADIATION EXPOSURE: 5,844 mGy (total radiation exposure for diagnostic and 
embolization procedures).



COMPARISON: Brain CTA dated May 4, 2018; diagnostic cerebral DSA May 11, 2018.

 

CLINICAL HISTORY: 62-year-old female with right ocular pain, proptosis and 
chemosis. Patient underwent brain CTA on May 4, 2018, showing asymmetric 
enlargement of the superior ophthalmic vein on the right side and asymmetric 
filling of the right 

cavernous sinus. The patient underwent diagnostic cerebral angiography on May 
11, 2018. The patient is here today for embolization.





TECHNIQUE: 



The risk and benefits of the procedure were carefully discussed with and 
explained to the patient. The risks of the procedure include, but are not 
limited to, infection, hematoma, damage to any catheterized vessel, renal 
failure, stroke, worsening of the

 patient's neurological condition and contrast reaction. 



Informed consent was obtained. Under general anesthesia provided by the 
Anesthesiology service and utilizing a left femoral percutaneous approach, using
micropuncture technique, the following procedures were performed:   



                                        1. Right common carotid artery injection, neck examination, in biplane projections.



 

                                        2. Selective right internal carotid injection, cerebral examination, in biplane,

 obliques and magnification views.  



                                        3. Selective right external carotid injection, cerebral examination, in biplane 

projections.



                                        4. Selective injection of the right occipital artery in biplane projections. 



                                        5. Selective injection of the right ascending pharyngeal artery, in biplane projections.

 



                                        6. Rotational angiography ([place] CT) with selective injection of the right ascending

 pharyngeal artery, followed by 3-D and multiplanar reconstructions using a 
separate, dedicated workstation.



                                        7. Rotational angiography (3D imaging) with selective injection of the right ascending

 pharyngeal artery, followed by 3-D and multiplanar reconstructions using a 
separate, dedicated workstation.



FINDINGS:  



                                        1. Selective right common carotid injection, neck examination, shows a patent carotid

 bifurcation, without focal stenosis. There is a dural AV fistula supplied by 
the neuromeningeal trunk of the ascending pharyngeal artery. The shunt is 
located in the 

right hypoglossal canal. There is retrograde filling of the right sphenoparietal
sinus, cavernous sinus and superior ophthalmic vein.



                                        2. Selective right internal carotid injection, cerebral examination, shows normal

 antegrade flow intracranially into the anterior and middle cerebral arteries. 
There is some cross-filling through the anterior communicating artery. No 
arteriovenous 

shunting is seen in this injection. No aneurysm or focal stenosis is identified.
 The venous phase is within normal limits.    



                                        3. Selective right external carotid injection, head examination, with the tip of

 the catheter just distal to the takeoff of the ascending pharyngeal artery, 
shows faint opacification of the dural AV fistula, as well as retrograde filling
of the 

sphenoparietal sinus, cavernous sinus and superior ophthalmic vein.



                                        4. Selective right occipital artery injection, head examination, shows small contribution

 to the AV fistula by transmastoid branches of the occipital artery.



                                        5. Selective injection of the right ascending pharyngeal artery, head examination,

 centered over the skull base, shows a dural AV fistula supplied by branches of 
the neural meningeal trunk, centered in the hypoglossal canal, with retrograde 
filling of 

the sphenoparietal sinus, cavernous sinus, superior ophthalmic vein and orbital 
vein on the right side. The fistulous pouch measures approximately 9 mm in 
height x 14 mm in AP diameter.



                                        6. [place] CT was performed with selective injection of the right ascending pharyngeal

 artery. Reconstructed images show the fistulous pouch located within the right 
hypoglossal canal. The images obtained from the [place] CT were used to delineate 
the venous 

anatomy in this region, for catheter navigation.



                                        7. 3D imaging was also performed with selective injection of the right ascending

 pharyngeal artery. The reconstructed images depict in better detail the venous 
anatomy in the region of the right hypoglossal canal.



Following the diagnostic portion of the examination, which was used for 
operative/embolization planning, the embolization procedure was commenced. This 
is dictated under a separate report.

 

IMPRESSION:  

 

                                        1. 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

 the neuromeningeal trunk of the right ascending pharyngeal artery.



                                        2. No immediate complications were encountered.   

 



[facility]-[id number]











   



                 Performing Organization     Address         City/[facility]     Phone Number

 

   



                      [facility]          [address] [place] [address] 





* IR 3D Recon Slices Snapshots RDMPS (05/15/2018  4:40 PM CDT)



Only the most recent of 2 results within the time period is included.





 



                           Narrative                 Performed At

 

 



                           Non-Reportable/No report needed.     [facility]









   



                 Performing Organization     Address         City/[facility]     Phone Number

 

   



                      [facility]          [address] [place] [address] 





* IR Embolization Neurovascular (05/15/2018  4:40 PM CDT)





 



                           Narrative                 Performed At

 

 



                           EXAMINATION: Embolization OF A right hypoglossal canal Dural arteriovenous     [facility]

 [facility]



                                         fistula. 



                                         CLINICAL HISTORY: 62-year-old female patient with previously documented right 



                                         hypoglossal canal dural arteriovenous fistula. The patient is here for 



                                         endovascular treatment after diagnostic cerebral angiogram performed by Dr. Sim Agudelo. 



                                         RADIATION EXPOSURE: 5,844 mGy (total radiation exposure for diagnostic and 



                                         embolization procedures). 



                                         COMPARISON: Brain CTA dated May 4, 2018; diagnostic cerebral DSA May 11, 2018. 





                                         CLINICAL HISTORY: 62-year-old female with right ocular pain, proptosis and 



                                         chemosis. Patient underwent brain CTA on May 4, 2018, showing asymmetric 



                                         enlargement of the superior ophthalmic vein on the right side and asymmetric 



                                         filling of the right 



                                         cavernous sinus. The patient underwent diagnostic cerebral angiography on May 



                                         11, 2018. The patient is here today for embolization. 



                                         TECHNIQUE: 



                                         The risk and benefits of the procedure were carefully discussed with and 



                                         explained to the patient. The risks of the procedure include, but are not 



                                         limited to, infection, hematoma, damage to any catheterized vessel, renal 



                                         failure, stroke, worsening of the 



                                         patient's neurological condition and contrast reaction. 



                                         Informed consent was obtained. Under general anesthesia provided by the 



                                         Anesthesiology service and utilizing a left femoral percutaneous approach, using

 



                                         micropuncture technique, after the diagnostic arteriogram performed by Dr. Sim Agudelo utilizing a 



                                         right jugular percutaneous approach the following procedureswere performed.

 



                                         1. Successful endovascular treatment of the right hypoglossal canal dural 



                                         arteriovenous fistula using transvenous approach and coils and Newcastle embolization

 



                                         material. 



                                         2. Posttreatment Selective right internal carotid injection, cerebral 



                                         examination, in biplane, obliques and magnification views. 



                                         3. Posttreatment Selective right external carotid injection, cerebral 



                                         examination, in biplane projections. 



                                         4. Posttreatment left internal carotid indication headache examination in the AP

 



                                         and oblique some indications views. 



                                         5. Post treatment left external carotid injection] hepatopedal examination in 



                                         biplane obliques and magnification views. 



                                         6. Post treatment right vertebral artery injection. Examination with been no 



                                         recent medications use. 



                                         7. Post treatment left vertebral artery injection. Examination with been no 



                                         recent medications use. 



                                         8. Post treatment Rotational angiography ([place] CT) without contrast. 



                                         FINDINGS: 



                                         After Demonstration of the right hypoglossal canal dural arteriovenous fistula 





                                         and utilizing a right jugular venousapproach an Gassaway 10 microcatheter was

 



                                         navigated into the right hypoglossal canal. [place] CT with contrast demonstrated 





                                         correct position 



                                         of the microcatheter. 



                                         Subsequently multiple axial coils and Newcastle 34 embolization material were 



                                         utilized to completelydevascularize the hypoglossal dural fistula without 



                                         complications. 



                                         The posttreatment bilateral internal carotid injection, bilaterally external 



                                         carotid andbilateral vertebral artery injection head examination demonstrate

 



                                         complete devascularization of the hypoglossal canal dural AVF without residual 





                                         arteriovenous 



                                         shunting. 



                                         The posttreatment diagnostic CT head examination demonstrated only subluxation 





                                         material at the level of the hypoglossal canal. 



                                         IMPRESSION: 



                                         1. 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 



                                         hypoglossal canal, utilizing coils and Newcastle embolization material. 



                                         2. Follow-up cerebral arch UM in 6 months is recommended. 



                                         3. No complications. 



                                         [facility]-[id number] 









                                        Procedure Note

 

                                        



[facility], Radiology Results Incoming - 2018  5:47 PM CDT



EXAMINATION: Embolization OF A right hypoglossal canal Dural arteriovenous 
fistula.



CLINICAL HISTORY: 62-year-old female patient with previously documented right 
hypoglossal canal dural arteriovenous fistula. The patient is here for 
endovascular treatment after diagnostic cerebral angiogram performed by Dr. Sim Agudelo.



 

RADIATION EXPOSURE: 5,844 mGy (total radiation exposure for diagnostic and 
embolization procedures).



COMPARISON: Brain CTA dated May 4, 2018; diagnostic cerebral DSA May 11, 2018.

 

CLINICAL HISTORY: 62-year-old female with right ocular pain, proptosis and 
chemosis. Patient underwent brain CTA on May 4, 2018, showing asymmetric 
enlargement of the superior ophthalmic vein on the right side and asymmetric 
filling of the right 

cavernous sinus. The patient underwent diagnostic cerebral angiography on May 
11, 2018. The patient is here today for embolization.





TECHNIQUE: 



The risk and benefits of the procedure were carefully discussed with and 
explained to the patient. The risks of the procedure include, but are not 
limited to, infection, hematoma, damage to any catheterized vessel, renal 
failure, stroke, worsening of the

 patient's neurological condition and contrast reaction. 



Informed consent was obtained. Under general anesthesia provided by the 
Anesthesiology service and utilizing a left femoral percutaneous approach, using
micropuncture technique, after the diagnostic arteriogram performed by Dr. Sim Agudelo utilizing a 

right jugular percutaneous approach the following procedures  were performed.





                                        1. Successful endovascular treatment of the right hypoglossal canal dural arteriovenous

 fistula using transvenous approach and coils and Newcastle embolization material.



 

                                        2. Posttreatment Selective right internal carotid injection, cerebral examination,

 in biplane, obliques and magnification views.  



                                        3. Posttreatment Selective right external carotid injection, cerebral examination,

 in biplane projections.



 

                                        4. Posttreatment left internal carotid indication headache examination in the AP

 and oblique some indications views.





                                        5. Post treatment left external carotid injection] hepatopedal examination in biplane

 obliques and magnification views.





                                        6. Post treatment right vertebral artery injection. Examination with been no recent

 medications use.



                                        7. Post treatment left vertebral artery injection. Examination with been no recent

 medications use.





                                        8. Post treatment Rotational angiography ([place] CT) without contrast.





FINDINGS:  

After Demonstration of the right hypoglossal canal dural arteriovenous fistula 
and utilizing a right jugular venous  approach an Gassaway 10 microcatheter was 
navigated into the right hypoglossal canal. [place] CT with contrast demonstrated 
correct position 

of the microcatheter.

Subsequently multiple axial coils and Beck 34 embolization material were 
utilized to completely  devascularize the hypoglossal dural fistula without 
complications.



The posttreatment bilateral internal carotid injection, bilaterally external 
carotid and  bilateral vertebral artery injection head examination demonstrate 
complete devascularization of the hypoglossal canal dural AVF without residual 
arteriovenous 

shunting.



The posttreatment diagnostic CT head examination demonstrated only subluxation 
material at the level of the hypoglossal canal.







IMPRESSION:  

 

                                        1. 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

 canal, utilizing coils and Beck embolization material. 



                                        2. Follow-up cerebral arch UM in 6 months is recommended.



                                        3. No complications. 









[facility]-[id number]











   



                 Performing Organization     Address         City/State/Zipcode     Phone Number

 

   



                      [facility]          [address] [place] [address] 





* IR Angiogram Cerebral (2018 12:55 PM CDT)





 



                           Narrative                 Performed At

 

 



                           CEREBRAL ARTERIOGRAM      [facility]



                                         FLUORO TIME : 10.1 Minutes 



                                         RADIATION EXPOSURE: JOYCELYN 2431 mGy 



                                         COMPARISON: none. 



                                         CLINICAL HISTORY: 62-year-old female patient with right eye proptosis and 



                                         chemosis. Patient is here for diagnostic cerebral arteriogram. 



                                         TECHNIQUE: The risk and benefits of the procedure were explained to the patient.

 



                                         The risks include but are not limited to infection, hematoma, vascular damage, 





                                         renal failure, stroke, worsening of the neurological condition and contrast 



                                         reaction. 



                                         Informed consent was obtained. Under general anesthesia provided by 



                                         Anesthesiology and utilizing a right femoral percutaneous approach, the 



                                         following procedures were performed: 



                                         Right common carotid injection neck examination in biplane projections. 



                                         Selective right internal carotid injection head examination in biplane, obliques

 



                                         and magnification views. 



                                         Right external carotid injection head examination in biplane projections. 



                                         Right external carotid injection head examination dimensional rotational views.

 



                                         Right external carotid injection head examination [place] CT. 



                                         Left common carotid injection neck examination in biplane projections. 



                                         Selective left internal carotid injection head examination in biplane, obliques

 



                                         and magnification is views. 



                                         Left external carotid injection head examination in biplane projections. 



                                         Selective left vertebral artery injection neck examination in biplane 



                                         projections. 



                                         Selective left vertebral artery injection head examination in biplane 



                                         projections. 



                                         Selective right vertebral artery injection neck examination in biplane 



                                         projections. 



                                         Selective right vertebral artery injection head examination in biplane 



                                         projections. 



                                         FINDINGS: 



                                         The right common carotid injection neck examination demonstrate normal 



                                         bifurcation without evidence of critical stenosis. There is an arteriovenous 



                                         shunting with visualization of the jugular vein and inferior petrosal sinus. 



                                         The Right internal carotid injection head examination on the right external 



                                         carotid injection head examination including the three-dimensional rotational 



                                         views demonstrate normal antegrade flow into the intracranial circulation 



                                         without evidence of focal 



                                         stenosis or aneurysms . 



                                         There is a type II dural arteriovenous fistula atthe level of the right 



                                         hypoglossal canal supplied by the neuromeningeal trunk of the right ascending 



                                         pharyngeal, superior hypophyseal artery and inferolateral trunk of the right 



                                         internal carotid, right 



                                         posterior auricular artery and artery of the foramen rotundum. 



                                         Right posterior auricular artery and artery of the foramen rotundum. The AV 



                                         shunting is at the level of the hypoglossal foramen with retrograde venous 



                                         drainage into the inferior petrosal sinus, right cavernous sinus and right 



                                         superior ophthalmic vein 



                                         with intraorbital venous hypertension. There is also atrophy drainage into the 





                                         right jugular vein. Those findings were better demonstrated on the 3-dimensional

 



                                         rotational views and [place] CT. 



                                         The left common carotid injection neck examination demonstrate normal 



                                         bifurcation without evidence of critical stenosis. 



                                         The left internal carotid injection head examination demonstrate normal 



                                         antegrade flow into the intracranial circulation. Filling of the right 



                                         hypoglossal dural arteriovenous fistula from the cavernous branches of the left

 



                                         internal carotid were 



                                         identified. 



                                         The left external carotid injection head examination demonstrate normal 



                                         branching without evidence of arteriovenous fistula. 



                                         The left vertebral artery injection neck examination demonstrate normal 



                                         vertebral artery without stenosis or dissections. 



                                         The right vertebral artery injection neck examination demonstrate normal 



                                         vertebral artery without stenosis or dissections. 



                                         The right vertebral artery injection and left vertebral artery injection head 



                                         examination demonstrate normal antegrade flow into the intracranial circulation

 



                                         without evidence of focal stenosis or aneurysms. There is filling of the right 





                                         hypoglossal 



                                         foramen arteriovenous fistula supplied by the bilateral C3 anastomotic branch of

 



                                         the vertebral arteries. 



                                          



                                         There were no complications during the procedure. At the end of the procedure 



                                         the femoral sheath was removed and hemostasis was obtained using manual 



                                         compression and Vascade device. The patient was transferred to PACU without 



                                         changes in the neurological 



                                         status. 



                                         IMPRESSION: 



                                         1. 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 



                                         2. Negative foraneurysms or arteriovenous malformations. 



                                         3. No complications. 



                                         [facility]-[id number] 









                                        Procedure Note

 

                                        



[facility], Radiology Results Incoming - 2018  5:24 PM CDT



CEREBRAL ARTERIOGRAM  

 

FLUORO TIME : 10.1 Minutes 



RADIATION EXPOSURE: JOYCELYN 2431 mGy



COMPARISON: none.

 

CLINICAL HISTORY: 62-year-old female patient with right eye proptosis and 
chemosis. Patient is here for diagnostic cerebral arteriogram.





TECHNIQUE: The risk and benefits of the procedure were explained to the patient.
The risks include but are not limited to infection, hematoma, vascular damage, 
renal failure, stroke, worsening of the neurological condition and contrast 
reaction. 

Informed consent was obtained. Under general anesthesia provided by 
Anesthesiology and utilizing a right femoral percutaneous approach, the 
following procedures were performed:   



Right common carotid injection neck examination in biplane projections.

 

Selective right internal carotid injection head examination in biplane, obliques
and magnification views.  



Right external carotid injection head examination in biplane projections.



Right external carotid injection head examination dimensional rotational views.



Right external carotid injection head examination [place] CT.



Left common carotid injection neck examination in biplane projections.

 

Selective left internal carotid injection head examination in biplane, obliques 
and magnification is views.  



Left external carotid injection head examination in biplane projections.

 

Selective left vertebral artery injection neck examination in biplane 
projections.  



Selective left vertebral artery injection head examination in biplane 
projections.



Selective right vertebral artery injection neck examination in biplane 
projections.  



Selective right vertebral artery injection head examination in biplane 
projections.

 

FINDINGS:  

The right common carotid injection neck examination demonstrate normal 
bifurcation without evidence of critical stenosis. There is an arteriovenous 
shunting with visualization of the jugular vein and inferior petrosal sinus.



The Right internal carotid injection head examination on the right external 
carotid injection head examination including the three-dimensional rotational 
views demonstrate normal antegrade flow into the intracranial circulation 
without evidence of focal 

stenosis or aneurysms .  



There is a type II dural arteriovenous fistula at  the level of the right 
hypoglossal canal supplied by the neuromeningeal trunk of the right ascending 
pharyngeal, superior hypophyseal artery and inferolateral trunk of the right 
internal carotid, right 

posterior auricular artery and artery of the foramen rotundum. 

 Right posterior auricular artery and artery of the foramen rotundum. The AV 
shunting is at the level of the hypoglossal foramen with retrograde venous 
drainage into the inferior petrosal sinus, right cavernous sinus and right 
superior ophthalmic vein 

with intraorbital venous hypertension. There is also atrophy drainage into the 
right jugular vein. Those findings were better demonstrated on the 3-dimensional
rotational views and [place] CT.



The left common carotid injection neck examination demonstrate normal 
bifurcation without evidence of critical stenosis.



The left internal carotid injection head examination demonstrate normal 
antegrade flow into the intracranial circulation. Filling of the right 
hypoglossal dural arteriovenous fistula from the cavernous branches of the left 
internal carotid were 

identified.



The left external carotid injection head examination demonstrate normal 
branching without evidence of arteriovenous fistula.



The left vertebral artery injection neck examination demonstrate normal 
vertebral artery without stenosis or dissections. 



The right vertebral artery injection neck examination demonstrate normal 
vertebral artery without stenosis or dissections.  



The right vertebral artery injection and left vertebral artery injection head 
examination demonstrate normal antegrade flow into the intracranial circulation 
without evidence of focal stenosis or aneurysms. There is filling of the right 
hypoglossal 

foramen arteriovenous fistula supplied by the bilateral C3 anastomotic branch of
the vertebral arteries.





    

There were no complications during the procedure. At the end of the procedure 
the femoral sheath was removed and hemostasis was obtained using manual 
compression and Vascade device. The patient was transferred to PACU without 
changes in the neurological 

status. 

 

IMPRESSION:  

 

 1. 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 

 

 2. Negative for  aneurysms or arteriovenous malformations.  

 

 3. No complications.   

 



[facility]-[id number]











   



                 Performing Organization     Address         City/State/Zipcode     Phone Number

 

   



                     [facility]          [address] [place] [address] 





* CTA Head W Wo Contrast (2018  6:00 PM CDT)





 



                           Narrative                 Performed At

 

 



                           EXAMINATION: CT ANGIOGRAM HEAD W WO CONTRAST      [facility]



                                         CLINICAL HISTORY: H57.11 Ocular painright eye, H57.11 



                                         COMPARISON:None 



                                         TECHNIQUE:Imaging of the intracranial circulation was obtained from the 



                                         skull base to the vertex during the arterial phase of enhancement. 



                                         Postprocessing was performed with MIP multiplanar and 3D reconstructed 



                                         images.CT imaging was performed with 



                                         iterative reconstruction technique and/or automated exposure control to reduce 





                                         radiation dose. 



                                         FINDINGS: 



                                         Moderate right-sided proptosis, asymmetric enlargement of the right superior 



                                         ophthalmic vein, and asymmetric filling of the right cavernous sinus. Mild 



                                         periorbital and retro-orbital fat stranding. Stretching of the right optic 



                                         nerve. 



                                         The major branches of the anterior and posterior arterial circulations of the 



                                         brain appear patent without evidence of focal stenosis or aneurysm. Mild 



                                         arteriosclerosis of the V4 segments of the vertebral arteries and cavernous and

 



                                         paraclinoid internal 



                                         carotid arteries without significant stenosis. Major venous sinuses appear 



                                         patent. 



                                         IMPRESSION: 



                                         Moderate right-sided proptosis with stretching of the right optic nerve and 



                                         periorbital and retro-orbital fat stranding, asymmetric enlargement of the right

 



                                         superior ophthalmic vein, and asymmetric filling of the right cavernous sinus 



                                         concerning for 



                                         right carotid cavernous sinus fistula. Conventional angiography could be 



                                         performed for further evaluation and treatment if necessary. 



                                         Confirmation of report receipt will be tracked in Epic. 



                                         [id number] 









                                        Procedure Note

 

                                        



 Interface, Radiology Results Incoming - 2018  6:18 PM CDT



EXAMINATION: CT ANGIOGRAM HEAD W WO CONTRAST



CLINICAL HISTORY: H57.11 Ocular pain  right eye, H57.11



COMPARISON:  None



TECHNIQUE:  Imaging of the intracranial circulation was obtained from the skull 
base to the vertex during the arterial phase of enhancement. Postprocessing was 
performed with MIP multiplanar and 3D reconstructed images.  CT imaging was 
performed with 

iterative reconstruction technique and/or automated exposure control to reduce 
radiation dose.





FINDINGS:



Moderate right-sided proptosis, asymmetric enlargement of the right superior 
ophthalmic vein, and asymmetric filling of the right cavernous sinus. Mild 
periorbital and retro-orbital fat stranding. Stretching of the right optic 
nerve.



The major branches of the anterior and posterior arterial circulations of the 
brain appear patent without evidence of focal stenosis or aneurysm. Mild 
arteriosclerosis of the V4 segments of the vertebral arteries and cavernous and 
paraclinoid internal 

carotid arteries without significant stenosis. Major venous sinuses appear 
patent.



IMPRESSION:

Moderate right-sided proptosis with stretching of the right optic nerve and 
periorbital and retro-orbital fat stranding, asymmetric enlargement of the right
superior ophthalmic vein, and asymmetric filling of the right cavernous sinus 
concerning for 

right carotid cavernous sinus fistula. Conventional angiography could be 
performed for further evaluation and treatment if necessary.





Confirmation of report receipt will be tracked in Epic.





[id number]











   



                 Performing Organization     Address         City/[facility]/Zipcode     Phone Number

 

   



                     [facility]          [address] [place] [address] 





* POC creatinine (2018  5:33 PM CDT)





   



                 Component       Value           Ref Range       Performed At

 

   



                 POC creatinine     0.9             0.5 - 0.9 mg/dl     [facility] DEPARTMENT OF



                                         PATHOLOGY AND



                                         GENOMIC MEDICINE













                                         Specimen

 





                                         Blood









   



                 Performing Organization     Address         City/[facility]/Zipcode     Phone Number

 

   



                     [address] [place] [address] 



                                         PATHOLOGY AND GENOMIC   



                                         MEDICINE   





after 2018



Insurance







     



            Payer      Benefit     Subscriber ID     Type       Phone      Address



                                         Plan /    



                                         Group    

 

     



                 [employer]            [employer]            xxxxxxxxxxxx     PPO  



                                         CHOICE    



                                         PPO/[employer] PPO    









     



            Guarantor Name     Account     Relation to     Date of     Phone      Billing Address



                     Type                Patient             Birth  

 

     



            Dipti Law     Personal/F     Self       1955     [phone] [address] [facility]               ([place])              [place] [address]







Advance Directives





Patient has advance care planning documents on file. For more information, [employer] contact:



Tamir Larsen



[address] [place] [address]

## 2019-03-05 ENCOUNTER — HOSPITAL ENCOUNTER (OUTPATIENT)
Dept: HOSPITAL 88 - OR | Age: 64
Discharge: HOME | End: 2019-03-05
Attending: OPHTHALMOLOGY
Payer: MEDICARE

## 2019-03-05 VITALS — DIASTOLIC BLOOD PRESSURE: 70 MMHG | SYSTOLIC BLOOD PRESSURE: 133 MMHG

## 2019-03-05 DIAGNOSIS — Z79.84: ICD-10-CM

## 2019-03-05 DIAGNOSIS — Z95.5: ICD-10-CM

## 2019-03-05 DIAGNOSIS — E11.9: ICD-10-CM

## 2019-03-05 DIAGNOSIS — I25.10: ICD-10-CM

## 2019-03-05 DIAGNOSIS — H25.12: Primary | ICD-10-CM

## 2019-03-05 DIAGNOSIS — Z79.82: ICD-10-CM

## 2019-03-05 DIAGNOSIS — I10: ICD-10-CM

## 2019-03-05 DIAGNOSIS — Z88.6: ICD-10-CM

## 2019-03-05 PROCEDURE — 66984 XCAPSL CTRC RMVL W/O ECP: CPT

## 2019-03-05 PROCEDURE — 36415 COLL VENOUS BLD VENIPUNCTURE: CPT

## 2019-03-05 PROCEDURE — 82948 REAGENT STRIP/BLOOD GLUCOSE: CPT

## 2019-03-05 NOTE — XMS REPORT
Clinical Summary

                             Created on: 2019



Dipti Law

External Reference #: [id number]

: 1955

Sex: Female



Demographics







                          Address                   [address] [place]  [address]

 

                          Home Phone                [phone]

 

                          Preferred Language        English

 

                          Marital Status            

 

                          Taoism Affiliation     [id number]

 

                          Race                      White

 

                          Ethnic Group              /Latin





Author







                          Author                    [facility]

 

                          Organization              [facility]

 

                          Address                   Unknown

 

                          Phone                     Unavailable







Support







                Name            Relationship    Address         Phone

 

                    Hitesh Law        [address]                [address] [address]

[place]  [address]                      [phone]







Care Team Providers







                    Care Team Member Name    Role                Phone

 

                    Marcos Milton MD    PCP                 [phone]







Allergies







                                        Comments



                 Active Allergy     Reactions       Severity        Noted Date 

 

                                        



Heart beat changes



                           Codeine                   05/15/2018 







Medications







                          End Date                  Status



              Medication     Sig          Dispensed     Refills      Start  



                                         Date  

 

                                                    Active



                     amLODIPine (NORVASC) 10     amlodipine 10       0   



                           mg tablet                 mg tablet     



                                         Take 1     



                                         tablet every     



                                         day by oral     



                                         route.     

 

                                                    Active



                     atorvastatin (LIPITOR) 10     atorvastatin        0   



                           MG tablet                 10 mg tablet     



                                         TAKE 1     



                                         TABLET BY     



                                         MOUTH EVERY     



                                         DAY     

 

                                                    Active



                     calcium carbonate     TAKE ONE            0   



                           (CALCIUM 500 ORAL)        TABLET BY     



                                         MOUTH ONCE A     



                                         DAY     

 

                                                    Active



                     metoprolol succinate XL     Take 1 tablet       0   



                           (TOPROL-XL) 50 mg 24 hr     every day by     



                           tablet                    oral route.     

 

                                                    Active



                     metFORMIN (GLUCOPHAGE)     metformin 500       0   



                           500 mg tablet             mg tablet     



                                         TAKE 1     



                                         TABLET BY     



                                         MOUTH TWICE     



                                         DAILY     

 

                          05/15/2018                Discontinued



                     amLODIPine (NORVASC) 10     Take 1 tablet       0   



                           mg tablet                 every day by     



                                         oral route.     

 

                          05/15/2018                Discontinued



                     atorvastatin (LIPITOR) 10     TAKE 1 TABLET       0   



                           MG tablet                 BY MOUTH     



                                         EVERY DAY     

 

                          05/15/2018                Discontinued



                     calcium carbonate     calcium             0   



                           (CALCIUM 500 ORAL)        TAKE ONE     



                                         TABLET BY     



                                         MOUTH ONCE A     



                                         DAY     

 

                          05/15/2018                Discontinued



                     metoprolol succinate XL     metoprolol          0   



                           (TOPROL-XL) 50 mg 24 hr     succinate ER     



                           tablet                    50 mg     



                                         tablet,extend     



                                         ed release 24     



                                         hr     



                                         Take 1     



                                         tablet every     



                                         day by oral     



                                         route.     

 

                          05/15/2018                Discontinued



                     metFORMIN (GLUCOPHAGE)     TAKE 1 TABLET       0   



                           500 mg tablet             BY MOUTH     



                                         TWICE DAILY     

 

                          2018                



              methylPREDNISolone     follow       21 tablet     0              



                     (MEDROL, TOM,) 4 mg     package             8  



                           tablet                    directions     







Active Problems







 



                           Problem                   Noted Date

 

 



                           Type 2 diabetes mellitus with hyperglycemia, without long-term current use     2018





                                         of insulin 

 

 



                           Essential hypertension     2018

 

 



                           Dural arteriovenous fistula     05/15/2018







Encounters







                          Care Team                 Description



                     Date                Type                Specialty  

 

                                        



Sotero Man MD Bhardwaj, Sergio Mccloud MD                 Dural arteriovenous fistula (Primary Dx); 

Arteriovenous fistula, acquired; 

Type 2 diabetes mellitus with hyperglycemia, without long-term current use of 
insulin



                     05/15/2018          Hospital            Neurosurgery  



                           -                         Encounter   



                                         2018    

 

                                        



Brice Dalton MD                C-C fistula



                     05/15/2018          Hospital            Radiology  



                                         Encounter   

 

                                        



Janna Abbott MD                    



                     05/15/2018          Anesthesia          Radiology  



                                         Event   

 

                                        



Sotero Man MD               Arteriovenous fistula, acquired (Primary Dx)



                     05/15/2018          Transcribe          Radiology  



                                         Orders   

 

                                        



Anita Piper MD                      



                     2018          Anesthesia          Radiology  



                                         Event   

 

                                        



Brice Dalton MD                Carotid-cavernous fistula



                     2018          Hospital            Radiology  



                                         Encounter   

 

                                        



Brice Dlaton MD                Carotid-cavernous fistula (Primary Dx)



                     2018          Transcribe          Radiology  



                                         Orders   

 

                                        



Patrick Grimes, DO                      Pain in right eye



                     2018          Hospital            Radiology  



                                         Encounter   

 

                                        



Patrick Grimes, DO                      Pain in right eye (Primary Dx)



                     2018          Transcribe          Access  



                                         Orders   



after 2018



Social History







                                        Date



                 Tobacco Use     Types           Packs/Day       Years Used 

 

                                         



                                         Never Smoker    

 

    



                                         Smokeless Tobacco: Never   



                                         Used   









   



                 Alcohol Use     Drinks/Week     oz/Week         Comments

 

   



                           Yes                       occasionally









 



                           Sex Assigned at Birth     Date Recorded

 

 



                                         Not on file 









                                        Industry



                           Job Start Date            Occupation 

 

                                        Not on file



                           Not on file               Not on file 









                                        Travel End



                           Travel History            Travel Start 

 





                                         No recent travel history available.







Last Filed Vital Signs







                                        Time Taken



                           Vital Sign                Reading 

 

                                        2018 12:39 PM CDT



                           Blood Pressure            156/69 

 

                                        2018 12:39 PM CDT



                           Pulse                     58 

 

                                        2018 12:39 PM CDT



                           Temperature               36.4 C (97.6 F) 

 

                                        2018 12:39 PM CDT



                           Respiratory Rate          18 

 

                                        2018 12:39 PM CDT



                           Oxygen Saturation         95% 

 

                                        -



                           Inhaled Oxygen            - 



                                         Concentration  

 

                                        05/15/2018 11:22 AM CDT



                           Weight                    76.2 kg (168 lb) 

 

                                        05/15/2018 11:22 AM CDT



                           Height                    154.9 cm (5' 1") 

 

                                        05/15/2018 11:22 AM CDT



                           Body Mass Index           31.74 







Plan of Treatment







   



                 Health Maintenance     Due Date        Last Done       Comments

 

   



                           DIABETIC RETINAL EYE EXAM     1955  

 

   



                           DIABETIC FOOT EXAM        1963  

 

   



                           URINE MICROALBUMIN        1963  

 

   



                           CERVICAL CANCER SCREENING     1974  

 

   



                           BREAST CANCER SCREENING     2003  

 

   



                           COLON CANCER SCREENING     2003  

 

   



                           SHINGLES VACCINES (#1)     2003  

 

   



                           65+ PNEUMOCOCCAL VACCINE     2018  



                                         (1 of 2 - PCV13)   

 

   



                           PNEUMOCOCCAL              2018  



                                         POLYSACCHARIDE VACCINE   



                                         AGE 65 AND OVER   

 

   



                           INFLUENZA VACCINE         2018  







Implants







                    Device Identifier    Shelf Expiration Date    Model / Serial / Lot



                 Implanted       Type            Area            Manufactur   



                                         er   

 

                                        2019          [id number] /

 /

                                        [id number]



                     Device Vasclr Clsr Vasoactive     Cardiovasc          N/A: N/A    



                           Intstnl Peptd 6fr Angio-Seal -     ular     



                           [id number]                Implants     



                                         Implanted: 2018 (Quantity not      



                                         on file)      

 

                                        2019          [id number] /

 /

                                        [id number]



                     Device Vasclr Clsr Vasoactive     Cardiovasc          N/A: N/A    



                           Intstnl Peptd 6fr Angio-Seal -     ular     



                           [id number]                Implants     



                                         Implanted: 05/15/2018 (Quantity not      



                                         on file)      

 

                                                            [id number] /

 /





                 Catheter Angio Imager Ii 5fr 100cm     Surgical        N/A: N/A        [facility]   



                     Selc Braidd Torque [place] -     Implants;           PERIPHERAL   



                     [id number]          Expanders;          INTERVENTI   



                     Implanted: 2018 (Quantity not     Extenders;          ON   



                     on file)            Surgical            VASCULAR   



                           Wires                     RUBIN   

 

                                                            [id number] /

 /





                 Catheter Thrmbtmy Neuron Max 088     Surgical        N/A: N/A        PENUMBRA   



                     Str 6fr 80x4cm - [id number]     Implants;           INC   



                           Implanted: 05/15/2018 (Quantity not     Expanders;     



                           on file)                  Extenders;     



                                         Surgical     



                                         Wires     

 

                                                            [id number] /

 /





                 Catheter Angiography Diag [facility]     Surgical        N/A: N/A        [facility]   



                     Torque Imager Ii 5fr 100cm -     Implants;           PERIPHERAL   



                     [id number]          Expanders;          INTERVENTI   



                     Implanted: 05/15/2018 (Quantity not     Extenders;          ON   



                     on file)            Surgical            VASCULAR   



                           Wires                     RUBIN   

 

                                                            [id number] /

 /





                 Catheter Neurvas [place] 10 1lmn     Surgical        N/A: N/A        [employer]   



                     Renfrcd 2.1-1.7fr Micr - [id number]     Implants;           [place] -   



                     Implanted: 05/15/2018 (Quantity not     Expanders;          VASCULAR   



                           on file)                  Extenders;     



                                         Surgical     



                                         Wires     

 

                                                            FC 12 30 3D /

 /





                 Coil Axium [id number] Detachable     Vascular        N/A: N/A        [employer]   



                     (Frame) 12 X 30 - [id number]     Coil                [place] -   



                           Implanted: 05/15/2018 (Quantity not       VASCULAR   



                                         on file)      

 

                                                            FC 10 40 3D /

 /





                 Coil Axium [id number] Detachable     Vascular        N/A: N/A        [employer]   



                     (Frame) 10 X 40 - [id number]     Coil                [place] -   



                           Implanted: 05/15/2018 (Quantity not       VASCULAR   



                                         on file)      







Procedures







                                        Comments



                 Procedure Name     Priority        Date/Time       Associated Diagnosis 

 

                                        



Results for this procedure are in the results section.



                     POC GLUCOSE         Routine             2018  



                                         1:44 PM CDT  

 

                                        



Results for this procedure are in the results section.



                     POC GLUCOSE         Routine             2018  



                                         7:39 AM CDT  

 

                                        



Results for this procedure are in the results section.



                     POC GLUCOSE         Routine             2018  



                                         12:13 AM CDT  

 

                                        



Results for this procedure are in the results section.



                     POC GLUCOSE         Routine             2018  



                                         7:33 PM CDT  

 

                                        



Results for this procedure are in the results section.



                     POC GLUCOSE         Routine             2018  



                                         3:38 PM CDT  

 

                                        



Results for this procedure are in the results section.



                     POC GLUCOSE         Routine             2018  



                                         11:34 AM CDT  

 

                                        



Results for this procedure are in the results section.



                     POC GLUCOSE         Routine             2018  



                                         7:25 AM CDT  

 

                                        



Results for this procedure are in the results section.



                     POC GLUCOSE         Routine             2018  



                                         4:35 AM CDT  

 

                                        



Results for this procedure are in the results section.



                     ZZESTIMATED GFR     Routine             2018  



                                         12:44 AM CDT  

 

                                        



Results for this procedure are in the results section.



                     IONIZED CALCIUM     Routine             2018  



                                         12:44 AM CDT  

 

                                        



Results for this procedure are in the results section.



                     BASIC METABOLIC PANEL     Routine             2018  



                                         12:44 AM CDT  

 

                                        



Results for this procedure are in the results section.



                     PHOSPHORUS LEVEL     Routine             2018  



                                         12:44 AM CDT  

 

                                        



Results for this procedure are in the results section.



                     MAGNESIUM LEVEL     Routine             2018  



                                         12:44 AM CDT  

 

                                        



Results for this procedure are in the results section.



                     HC COMPLETE BLD COUNT     Routine             2018  



                           W/AUTO DIFF               12:35 AM CDT  

 

                                        



Results for this procedure are in the results section.



                     POC GLUCOSE         Routine             2018  



                                         12:04 AM CDT  

 

                                        



Results for this procedure are in the results section.



                     POC GLUCOSE         Routine             05/15/2018  



                                         10:35 PM CDT  

 

                                        



Results for this procedure are in the results section.



                     POC GLUCOSE         Routine             05/15/2018  



                                         8:53 PM CDT  

 

                                        



Results for this procedure are in the results section.



                 IR ANGIOGRAM CEREBRAL     Routine         05/15/2018      C-C fistula 



                           RIGHT                     4:41 PM CDT  

 

                                        



Results for this procedure are in the results section.



                     IR 3D RECON SLICES     Routine             05/15/2018  



                           SNAPSHOTS RDMPS           4:40 PM CDT  

 

                                        



Results for this procedure are in the results section.



                 IR EMBOLIZATION ARTERIAL     Routine         05/15/2018      Arteriovenous fistula, 



                     NEURO               4:40 PM CDT         acquired 

 

                                         



                     ARTERIAL LINE       Routine             05/15/2018  



                                         1:30 PM CDT  

 

  



                                         Procedure



                                         Note -



                                         Janna Abbott MD -



                                         05/15/2018



                                         1:30 PM



                                         CDT



                                         Arterial



                                         line



                                         Performed



                                         by:



                                         JANNA ABBOTT



                                         Authorized



                                         by:



                                         JANNA ABBOTT





                                         Patient



                                         Location:



                                         OR



                                         Start



                                         Time:



                                         5/15/2018



                                         1:30 PM



                                         End Time:



                                         5/15/2018



                                         1:30 PM



                                         Staff:



                                         Kodio



                                         shana:



                                         JANNA ABBOTT



                                         Performed



                                         by:



                                         Anesthesio



                                         shana



                                         Pre-proced



                                         ure:



                                         patient



                                         identified



                                         , IV



                                         checked,



                                         site and



                                         side



                                         verified,



                                         risks and



                                         benefits



                                         discussed,



                                         procedure



                                         verified,



                                         surgical



                                         consent



                                         complete,



                                         patient



                                         position



                                         confirmed,



                                         monitors



                                         and



                                         equipment



                                         checked



                                         and pre-op



                                         evaluation



                                         complete



                                         MSBT:



                                         antiseptic



                                         used, all



                                         elements



                                         of maximal



                                         sterile



                                         barrier



                                         technique



                                         followed,



                                         hand



                                         hygiene



                                         performed,



                                         cap/gown



                                         used by



                                         other



                                         personnel



                                         and



                                         solutions



                                         labeled



                                         Indication



                                         s:



                                         Indication



                                         s:



                                         hemodynami



                                         c



                                         monitoring



                                         Anesthesia



                                         :



                                         Anesthesia



                                         :  General



                                         Procedure



                                         Details:



                                         Arterial



                                         Line



                                         placement:



                                         Placed



                                         post



                                         induction



                                         Line



                                         placement



                                         site:



                                         Radial



                                         Line



                                         placement



                                         side:



                                         Left



                                         Arterial



                                         line



                                         gauge:  20



                                         G



                                         Number of



                                         attempts:



                                         1



                                         Ultrasound



                                         guidance



                                         used: No



                                         Post-proce



                                         dure:



                                         Post-proce



                                         dure:



                                         Sterile



                                         dressing



                                         applied



                                         Post



                                         procedure



                                         circulatio



                                         n,



                                         sensation,



                                         movement:



                                         Normal



                                         Patient



                                         tolerance:



                                         Patient



                                         tolerated



                                         the



                                         procedure



                                         well with



                                         no



                                         immediate



                                         complicati



                                         ons



 

                                         



                     NC AN ELECTIVE      Routine             05/15/2018  



                           ENDOTRACHEAL AIRWAY       1:29 PM CDT  

 

  



                                         Procedure



                                         Note -



                                         Janna Abbott MD -



                                         05/15/2018



                                         1:29 PM



                                         CDT



                                         Airway



                                         Date/Time:



                                         5/15/2018



                                         1:29 PM



                                         Performed



                                         by:



                                         JANNA ABBOTT



                                         Authorized



                                         by:



                                         JANNA ABBOTT





                                         Location:



                                         OR



                                         Urgency:



                                         Elective



                                         Difficult



                                         Airway: No



                                         Anesthesio



                                         logist:



                                         JANNA ABBOTT



                                         Performed



                                         by:



                                         anesthesio



                                         shana



                                         Preoxygena



                                         amna with



                                         100% O2:



                                         Yes



                                         C-spine



                                         Precaution



                                         s



                                         Maintained



                                         Throughout



                                         : Yes



                                         Mask



                                         Ventilatio



                                         n:  Easy



                                         mask



                                         Final



                                         Airway



                                         Type:



                                         Endotrache



                                         al airway



                                         Final



                                         Endotrache



                                         al Airway:



                                         ETT



                                         Cuffed:



                                         Yes



                                         Technique



                                         Used:



                                         Direct



                                         laryngosco



                                         py



                                         Devices/Me



                                         thods Used



                                         in



                                         Placement:



                                         Intubatin



                                         g stylet



                                         Insertion



                                         Site:



                                         Oral



                                         Blade



                                         Type:



                                         Coto



                                         Laryngosco



                                         pe



                                         Blade/Vide



                                         olaryngosc



                                         ope Blade



                                         Size:  2



                                         ETT Size



                                         (mm):  7.0



                                         Cuff at



                                         minimum



                                         occlusion



                                         pressure:



                                         Yes



                                         Measured



                                         from:



                                         Teeth



                                         ETT to



                                         Teeth



                                         (cm):  21



                                         Placement



                                         Verified



                                         by: CO2



                                         detection,



                                         direct



                                         visualizat



                                         ion and



                                         equal



                                         breath



                                         sounds



                                         Laryngosco



                                         pic view:



                                         Grade I -



                                         full view



                                         of glottis



                                         Rapid



                                         Sequence



                                         Induction



                                         (RSI): No





                                         Modified



                                         RSI: No



                                         Number of



                                         Attempts



                                         at



                                         Approach:



                                         1



                                         TOOTH



                                         PROTECTOR.



                                         DENTITION



                                         UNCHANGED.



 

                                        



Results for this procedure are in the results section.



                     POC GLUCOSE         Routine             05/15/2018  



                                         10:46 AM CDT  

 

                                        



Results for this procedure are in the results section.



                     POC GLUCOSE         Routine             2018  



                                         1:02 PM CDT  

 

                                        



Results for this procedure are in the results section.



                     IR 3D RECON SLICES     Routine             2018  



                           SNAPSHOTS RDMPS           12:55 PM CDT  

 

                                        



Results for this procedure are in the results section.



                 IR ANGIOGRAM CEREBRAL     Routine         2018      Carotid-cavernous fistula 



                           BILATERAL                 12:55 PM CDT  

 

                                         



                     NC AN ELECTIVE      Routine             2018  



                           ENDOTRACHEAL AIRWAY       11:35 AM CDT  

 

  



                                         Procedure



                                         Note -



                                         Anita Piper MD -



                                         2018



                                         11:35 AM



                                         CDT



                                         Airway



                                         Date/Time:



                                         2018



                                         11:26 AM



                                         Performed



                                         by: ANITA PIPER



                                         Authorized



                                         by: ANITA PIPER





                                         Location:



                                         OR



                                         Urgency:



                                         Elective



                                         Difficult



                                         Airway: No



                                         Preoxygena



                                         amna with



                                         100% O2:



                                         Yes



                                         C-spine



                                         Precaution



                                         s



                                         Maintained



                                         Throughout



                                         : Yes



                                         Mask



                                         Ventilatio



                                         n:



                                         Assisted



                                         mask



                                         Final



                                         Airway



                                         Type:



                                         Endotrache



                                         al airway



                                         Final



                                         Endotrache



                                         al Airway:



                                         ETT



                                         Cuffed:



                                         Yes



                                         Technique



                                         Used:



                                         Direct



                                         laryngosco



                                         py



                                         Devices/Me



                                         thods Used



                                         in



                                         Placement:



                                         Intubatin



                                         g stylet



                                         Insertion



                                         Site:



                                         Oral



                                         Blade



                                         Type:



                                         Coto



                                         Laryngosco



                                         pe



                                         Blade/Vide



                                         olaryngosc



                                         ope Blade



                                         Size:  2



                                         ETT Size



                                         (mm):  7.0



                                         Cuff at



                                         minimum



                                         occlusion



                                         pressure:



                                         Yes



                                         Measured



                                         from:



                                         Lips



                                         ETT to



                                         Lips (cm):



                                         23



                                         Placement



                                         Verified



                                         by: CO2



                                         detection,



                                         direct



                                         visualizat



                                         ion and



                                         equal



                                         breath



                                         sounds



                                         Laryngosco



                                         pic view:



                                         Grade IIa



                                         - partial



                                         view of



                                         glottis



                                         Rapid



                                         Sequence



                                         Induction



                                         (RSI): No





                                         Modified



                                         RSI: No



                                         Number of



                                         Attempts



                                         at



                                         Approach:



                                         1



 

                                        



Results for this procedure are in the results section.



                     POC GLUCOSE         Routine             2018  



                                         9:17 AM CDT  

 

                                        



Results for this procedure are in the results section.



                 CT ANGIOGRAM HEAD W WO     Routine         2018      Pain in right eye 



                           CONTRAST                  6:00 PM CDT  

 

                                        



Results for this procedure are in the results section.



                     POC CREATININE      Routine             2018  



                                         5:33 PM CDT  



after 2018



Results

* POC glucose (2018  1:44 PM CDT)



Only the most recent of 14 results within the time period is included.





   



                 Component       Value           Ref Range       Performed At

 

   



                 POC glucose     152 (H)         65 - 99 mg/dL     [facility] DEPARTMENT OF



                           Comment:                  PATHOLOGY AND



                           [facility] Notified RN           GENOMIC MEDICINE



                                         Meter ID: [id number]  



                                         : María Elena Sauer  









   



                 Performing Organization     Address         [place] [place] Number

 

   



                     [facility] DEPARTMENT OF     [address] [place] [address] 



                                         PATHOLOGY AND GENOMIC   



                                         MEDICINE   





* Estimated GFR (2018 12:44 AM CDT)





   



                 Component       Value           Ref Range       Performed At

 

   



                 GFR Non Af Amer     >90             mL/min/1.73 m2     [facility] DEPARTMENT OF



                                         PATHOLOGY AND



                                         GENOMIC MEDICINE

 

   



                 GFR Af Amer     >90             mL/min/1.73 m2     [facility] DEPARTMENT OF



                           Comment:                  PATHOLOGY AND



                           Chronic kidney disease: <60      GENOMIC MEDICINE



                                         mL/min/1.73m2  



                                         Kidney failure: <15  



                                         mL/min/1.73m2  



                                         The estimated GFR is  



                                         calculated from the  



                                         IDMS-traceable Modification of  



                                         Diet  



                                         in Renal Disease Equation. The  



                                         accuracy of the calculation is  



                                         poor when the  



                                         creatinine is normal.  



                                         Calculated values >90  



                                         mL/min/1.73m2 are not  



                                         reported.  



                                         This equation has not been  



                                         validated in children (<18  



                                         years), pregnant  



                                         women, the elderly (>70  



                                         years), or ethnic groups other  



                                         than Caucasians and  



                                          Americans.  













                                         Specimen

 





                                         Plasma specimen









   



                 Performing Organization     Address         [facility]     Phone Number

 

   



                     [facility] DEPARTMENT [address] [address] 



                                         PATHOLOGY AND GENOMIC   



                                         MEDICINE   





* Phosphorus level (2018 12:44 AM CDT)





   



                 Component       Value           Ref Range       Performed At

 

   



                 Phosphorus      3.2             2.4 - 4.5 mg/dL     [facility] DEPARTMENT OF



                                         PATHOLOGY AND



                                         GENOMIC MEDICINE













                                         Specimen

 





                                         Plasma specimen









   



                 Performing Organization     Address         City/[facility]     Phone Number

 

   



                     [place] [address] 



                                         PATHOLOGY AND GENOMIC   



                                         MEDICINE   





* Magnesium level (2018 12:44 AM CDT)





   



                 Component       Value           Ref Range       Performed At

 

   



                 Magnesium       1.8             1.6 - 2.4 mg/dL     [facility] DEPARTMENT OF



                                         PATHOLOGY AND



                                         GENOMIC MEDICINE













                                         Specimen

 





                                         Plasma specimen









   



                 Performing Organization     Address         City/[facility]     Phone Number

 

   



                     [place] [address] 



                                         PATHOLOGY AND GENOMIC   



                                         MEDICINE   





* Ionized calcium (2018 12:44 AM CDT)





   



                 Component       Value           Ref Range       Performed At

 

   



                     pH                  7.62                [facility] DEPARTMENT OF



                                         PATHOLOGY AND



                                         GENOMIC MEDICINE

 

   



                 Ionized calcium     1.06 (L)        1.11 - 1.32 mmol/L     [facility] DEPARTMENT OF



                                         PATHOLOGY AND



                                         GENOMIC MEDICINE













                                         Specimen

 





                                         Plasma specimen









   



                 Performing Organization     Address         City[place] [place] Number

 

   



                     [place] [address] 



                                         PATHOLOGY AND GENOMIC   



                                         MEDICINE   





* Basic metabolic panel (2018 12:44 AM CDT)





   



                 Component       Value           Ref Range       Performed At

 

   



                 Sodium          140             135 - 148 mEq/L     [facility] DEPARTMENT OF



                                         PATHOLOGY AND



                                         GENOMIC MEDICINE

 

   



                 Potassium       3.6             3.5 - 5.0 mEq/L     [facility] DEPARTMENT OF



                                         PATHOLOGY AND



                                         GENOMIC MEDICINE

 

   



                 Chloride        105             98 - 112 mEq/L     [facility] DEPARTMENT OF



                                         PATHOLOGY AND



                                         GENOMIC MEDICINE

 

   



                 CO2             21 (L)          24 - 31 mEq/L     [facility] DEPARTMENT OF



                                         PATHOLOGY AND



                                         GENOMIC MEDICINE

 

   



                 Anion gap       14@ANIO         7 - 15 mEq/L     [facility] DEPARTMENT OF



                                         PATHOLOGY AND



                                         GENOMIC MEDICINE

 

   



                 BUN             6 (L)           8 - 23 mg/dL     [facility] DEPARTMENT OF



                                         PATHOLOGY AND



                                         GENOMIC MEDICINE

 

   



                 Creatinine      0.5             0.5 - 0.9 mg/dL     [facility] DEPARTMENT OF



                                         PATHOLOGY AND



                                         GENOMIC MEDICINE

 

   



                 Glucose         203 (H)         65 - 99 mg/dL     [facility] DEPARTMENT OF



                                         PATHOLOGY AND



                                         GENOMIC MEDICINE

 

   



                 Calcium         8.5 (L)         8.8 - 10.2 mg/dL     [facility] DEPARTMENT OF



                                         PATHOLOGY AND



                                         GENOMIC MEDICINE













                                         Specimen

 





                                         Plasma specimen









   



                 Performing Organization     Address         City[facility]     Phone Number

 

   



                     [facility] [place] [address] 



                                         PATHOLOGY AND GENOMIC   



                                         MEDICINE   





* CBC with platelet and differential (2018 12:35 AM CDT)





   



                 Component       Value           Ref Range       Performed At

 

   



                 WBC             9.59            4.50 - 11.00 k/uL     [facility] DEPARTMENT OF



                                         PATHOLOGY AND



                                         GENOMIC MEDICINE

 

   



                 RBC             4.74            4.20 - 5.50 m/uL     [facility] DEPARTMENT OF



                                         PATHOLOGY AND



                                         GENOMIC MEDICINE

 

   



                 HGB             13.8            12.0 - 16.0 g/dL     [facility] DEPARTMENT OF



                                         PATHOLOGY AND



                                         GENOMIC MEDICINE

 

   



                 HCT             40.8            37.0 - 47.0 %     [facility] DEPARTMENT OF



                                         PATHOLOGY AND



                                         GENOMIC MEDICINE

 

   



                 MCV             86.1            82.0 - 100.0 fL     [facility] DEPARTMENT OF



                                         PATHOLOGY AND



                                         GENOMIC MEDICINE

 

   



                 MCH             29.1            27.0 - 34.0 pg     [facility] DEPARTMENT OF



                                         PATHOLOGY AND



                                         GENOMIC MEDICINE

 

   



                 MCHC            33.8            31.0 - 37.0 g/dL     [facility] DEPARTMENT OF



                                         PATHOLOGY AND



                                         GENOMIC MEDICINE

 

   



                 RDW - SD        41.1            37.0 - 55.0 fL     [facility] DEPARTMENT OF



                                         PATHOLOGY AND



                                         GENOMIC MEDICINE

 

   



                 MPV             10.1            8.8 - 13.2 fL     [facility] DEPARTMENT OF



                                         PATHOLOGY AND



                                         GENOMIC MEDICINE

 

   



                 Platelet count     247             150 - 400 k/uL     [facility] DEPARTMENT OF



                                         PATHOLOGY AND



                                         GENOMIC MEDICINE

 

   



                 Nucleated RBC     0.00            /100 WBC        [facility] DEPARTMENT OF



                                         PATHOLOGY AND



                                         GENOMIC MEDICINE

 

   



                 Neutrophils     92.3 (H)        39.0 - 69.0 %     [facility] DEPARTMENT OF



                                         PATHOLOGY AND



                                         GENOMIC MEDICINE

 

   



                 Lymphocytes     6.8 (L)         25.0 - 45.0 %     [facility] DEPARTMENT OF



                                         PATHOLOGY AND



                                         GENOMIC MEDICINE

 

   



                 Monocytes       0.4             0.0 - 10.0 %     [facility] DEPARTMENT OF



                                         PATHOLOGY AND



                                         GENOMIC MEDICINE

 

   



                 Eosinophils     0.0             0.0 - 5.0 %     [facility] DEPARTMENT OF



                                         PATHOLOGY AND



                                         GENOMIC MEDICINE

 

   



                 Basophils       0.1             0.0 - 1.0 %     [facility] DEPARTMENT OF



                                         PATHOLOGY AND



                                         GENOMIC MEDICINE

 

   



                 Immature granulocytes     0.4Comment: "Immature     0.0 - 1.0 %     [facility] DEPARTMENT OF





                           granulocytes"  (promyelocytes,      PATHOLOGY AND



                           myelocytes, metamyelocytes)      GENOMIC MEDICINE













                                         Specimen

 





                                         Blood









   



                 Performing Organization     Address         City/State/Zipcode     Phone Number

 

   



                     [facility] DEPARTMENT OF     [address] [place] [address] 



                                         PATHOLOGY AND GENOMIC   



                                         MEDICINE   





* IR Angiogram Cerebral Right (05/15/2018  4:41 PM CDT)





 



                           Narrative                 Performed At

 

 



                           CEREBRAL DIGITAL SUBTRACTION ANGIOGRAPHY (DSA)      [facility]



                                         RADIATION EXPOSURE: 5,844 mGy (total radiation exposure for diagnostic and 



                                         embolization procedures). 



                                         COMPARISON: Brain CTA dated May 4, 2018; diagnostic cerebral DSA May 11, 2018. 





                                         CLINICAL HISTORY: 62-year-old female with right ocular pain, proptosis and 



                                         chemosis. Patient underwent brain CTA on May 4, 2018, showing asymmetric 



                                         enlargement of the superior ophthalmic vein on the right side and asymmetric 



                                         filling of the right 



                                         cavernous sinus. The patient underwent diagnostic cerebral angiography on May 



                                         11, 2018. The patient is here today for embolization. 



                                         TECHNIQUE: 



                                         The risk and benefits of the procedure were carefully discussed with and 



                                         explained to the patient. The risks of the procedure include, but are not 



                                         limited to, infection, hematoma, damage to any catheterized vessel, renal 



                                         failure, stroke, worsening of the 



                                         patient's neurological condition and contrast reaction. 



                                         Informed consent was obtained. Under general anesthesia provided by the 



                                         Anesthesiology service and utilizing a left femoral percutaneous approach, using

 



                                         micropuncture technique, the following procedures were performed: 



                                         1. Right common carotid artery injection, neck examination, in biplane 



                                         projections. 



                                         2. Selective right internal carotid injection, cerebral examination, in biplane,

 



                                         obliques and magnification views. 



                                         3. Selective right external carotid injection, cerebral examination, in biplane

 



                                         projections. 



                                         4. Selective injection of the right occipital artery in biplane projections. 



                                         5. Selective injection of the right ascending pharyngeal artery, in biplane 



                                         projections. 



                                         6. Rotational angiography ([place] CT) with selective injection of the right 



                                         ascending pharyngeal artery, followed by 3-D and multiplanar reconstructions 



                                         using a separate, dedicated workstation. 



                                         7. Rotational angiography (3D imaging) with selective injection of the right 



                                         ascending pharyngeal artery, followed by 3-D and multiplanar reconstructions 



                                         using a separate, dedicated workstation. 



                                         FINDINGS: 



                                         1. Selective right common carotid injection, neck examination, shows a patent 



                                         carotid bifurcation, without focal stenosis. There is a dural AV fistula 



                                         supplied by the neuromeningeal trunk of the ascending pharyngeal artery. The 



                                         shunt is located in the 



                                         right hypoglossal canal. There is retrograde filling of the right sphenoparietal

 



                                         sinus, cavernous sinus and superior ophthalmic vein. 



                                         2. Selective right internal carotid injection, cerebral examination, shows 



                                         normal antegrade flow intracranially into the anterior and middle cerebral 



                                         arteries. There is some cross-filling through the anterior communicating artery.

 



                                         No arteriovenous 



                                         shunting is seen in this injection. No aneurysm or focal stenosis is 



                                         identified.The venous phase is within normal limits. 



                                         3. Selective right external carotid injection, head examination, with the tip of

 



                                         the catheter just distal to the takeoff of the ascending pharyngeal artery, 



                                         shows faint opacification of the dural AV fistula, as well as retrograde filling

 



                                         of the 



                                         sphenoparietal sinus, cavernous sinus and superior ophthalmic vein. 



                                         4. Selective right occipital artery injection, head examination, shows small 



                                         contribution to the AV fistula by transmastoid branches of the occipital artery.

 



                                         5. Selective injection of the right ascending pharyngeal artery, head 



                                         examination, centered over the skull base, shows a dural AV fistula supplied by

 



                                         branches of the neural meningeal trunk, centered in the hypoglossal canal, with

 



                                         retrograde filling of 



                                         the sphenoparietal sinus, cavernous sinus, superior ophthalmic vein and orbital

 



                                         vein on the right side. The fistulous pouch measures approximately 9 mm in 



                                         height x 14 mm in AP diameter. 



                                         6. [place] CT was performed with selective injection of the right ascending 



                                         pharyngeal artery. Reconstructed images show the fistulous pouch located within

 



                                         the right hypoglossal canal. The images obtained from the [place] CT were used to 





                                         delineate the venous 



                                         anatomy in this region, for catheter navigation. 



                                         7. 3D imaging was also performed with selective injection of the right ascending

 



                                         pharyngeal artery. The reconstructed images depict in better detail the venous 





                                         anatomy in the region of the right hypoglossal canal. 



                                         Following the diagnostic portion of the examination, which was used for 



                                         operative/embolization planning, the embolization procedure was commenced. This

 



                                         is dictated under a separate report. 



                                         IMPRESSION: 



                                         1. 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 





                                         by the neuromeningeal trunk of the right ascending pharyngeal artery. 



                                         2. No immediate complications were encountered. 



                                         [facility]-[id number] 









                                        Procedure Note

 

                                        



[facility], Radiology Results Incoming - 05/15/2018  5:56 PM CDT



CEREBRAL DIGITAL SUBTRACTION ANGIOGRAPHY (DSA)

 

RADIATION EXPOSURE: 5,844 mGy (total radiation exposure for diagnostic and 
embolization procedures).



COMPARISON: Brain CTA dated May 4, 2018; diagnostic cerebral DSA May 11, 2018.

 

CLINICAL HISTORY: 62-year-old female with right ocular pain, proptosis and 
chemosis. Patient underwent brain CTA on May 4, 2018, showing asymmetric 
enlargement of the superior ophthalmic vein on the right side and asymmetric 
filling of the right 

cavernous sinus. The patient underwent diagnostic cerebral angiography on May 
11, 2018. The patient is here today for embolization.





TECHNIQUE: 



The risk and benefits of the procedure were carefully discussed with and 
explained to the patient. The risks of the procedure include, but are not 
limited to, infection, hematoma, damage to any catheterized vessel, renal 
failure, stroke, worsening of the

 patient's neurological condition and contrast reaction. 



Informed consent was obtained. Under general anesthesia provided by the 
Anesthesiology service and utilizing a left femoral percutaneous approach, using
micropuncture technique, the following procedures were performed:   



                                        1. Right common carotid artery injection, neck examination, in biplane projections.



 

                                        2. Selective right internal carotid injection, cerebral examination, in biplane,

 obliques and magnification views.  



                                        3. Selective right external carotid injection, cerebral examination, in biplane 

projections.



                                        4. Selective injection of the right occipital artery in biplane projections. 



                                        5. Selective injection of the right ascending pharyngeal artery, in biplane projections.

 



                                        6. Rotational angiography ([place] CT) with selective injection of the right ascending

 pharyngeal artery, followed by 3-D and multiplanar reconstructions using a 
separate, dedicated workstation.



                                        7. Rotational angiography (3D imaging) with selective injection of the right ascending

 pharyngeal artery, followed by 3-D and multiplanar reconstructions using a 
separate, dedicated workstation.



FINDINGS:  



                                        1. Selective right common carotid injection, neck examination, shows a patent carotid

 bifurcation, without focal stenosis. There is a dural AV fistula supplied by 
the neuromeningeal trunk of the ascending pharyngeal artery. The shunt is 
located in the 

right hypoglossal canal. There is retrograde filling of the right sphenoparietal
sinus, cavernous sinus and superior ophthalmic vein.



                                        2. Selective right internal carotid injection, cerebral examination, shows normal

 antegrade flow intracranially into the anterior and middle cerebral arteries. 
There is some cross-filling through the anterior communicating artery. No 
arteriovenous 

shunting is seen in this injection. No aneurysm or focal stenosis is identified.
 The venous phase is within normal limits.    



                                        3. Selective right external carotid injection, head examination, with the tip of

 the catheter just distal to the takeoff of the ascending pharyngeal artery, 
shows faint opacification of the dural AV fistula, as well as retrograde filling
of the 

sphenoparietal sinus, cavernous sinus and superior ophthalmic vein.



                                        4. Selective right occipital artery injection, head examination, shows small contribution

 to the AV fistula by transmastoid branches of the occipital artery.



                                        5. Selective injection of the right ascending pharyngeal artery, head examination,

 centered over the skull base, shows a dural AV fistula supplied by branches of 
the neural meningeal trunk, centered in the hypoglossal canal, with retrograde 
filling of 

the sphenoparietal sinus, cavernous sinus, superior ophthalmic vein and orbital 
vein on the right side. The fistulous pouch measures approximately 9 mm in 
height x 14 mm in AP diameter.



                                        6. [place] CT was performed with selective injection of the right ascending pharyngeal

 artery. Reconstructed images show the fistulous pouch located within the right 
hypoglossal canal. The images obtained from the [place] CT were used to delineate 
the venous 

anatomy in this region, for catheter navigation.



                                        7. 3D imaging was also performed with selective injection of the right ascending

 pharyngeal artery. The reconstructed images depict in better detail the venous 
anatomy in the region of the right hypoglossal canal.



Following the diagnostic portion of the examination, which was used for 
operative/embolization planning, the embolization procedure was commenced. This 
is dictated under a separate report.

 

IMPRESSION:  

 

                                        1. 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

 the neuromeningeal trunk of the right ascending pharyngeal artery.



                                        2. No immediate complications were encountered.   

 



[facility]-[id number]











   



                 Performing Organization     Address         City/[facility]     Phone Number

 

   



                      [facility]          [address] [place] [address] 





* IR 3D Recon Slices Snapshots RDMPS (05/15/2018  4:40 PM CDT)



Only the most recent of 2 results within the time period is included.





 



                           Narrative                 Performed At

 

 



                           Non-Reportable/No report needed.     [facility]









   



                 Performing Organization     Address         City/[facility]     Phone Number

 

   



                     [facility]          [address] [place] [address] 





* IR Embolization Neurovascular (05/15/2018  4:40 PM CDT)





 



                           Narrative                 Performed At

 

 



                           EXAMINATION: Embolization OF A right hypoglossal canal Dural arteriovenous     [facility]

 [facility]



                                         fistula. 



                                         CLINICAL HISTORY: 62-year-old female patient with previously documented right 



                                         hypoglossal canal dural arteriovenous fistula. The patient is here for 



                                         endovascular treatment after diagnostic cerebral angiogram performed by Dr. Sim Agudelo. 



                                         RADIATION EXPOSURE: 5,844 mGy (total radiation exposure for diagnostic and 



                                         embolization procedures). 



                                         COMPARISON: Brain CTA dated May 4, 2018; diagnostic cerebral DSA May 11, 2018. 





                                         CLINICAL HISTORY: 62-year-old female with right ocular pain, proptosis and 



                                         chemosis. Patient underwent brain CTA on May 4, 2018, showing asymmetric 



                                         enlargement of the superior ophthalmic vein on the right side and asymmetric 



                                         filling of the right 



                                         cavernous sinus. The patient underwent diagnostic cerebral angiography on May 



                                         11, 2018. The patient is here today for embolization. 



                                         TECHNIQUE: 



                                         The risk and benefits of the procedure were carefully discussed with and 



                                         explained to the patient. The risks of the procedure include, but are not 



                                         limited to, infection, hematoma, damage to any catheterized vessel, renal 



                                         failure, stroke, worsening of the 



                                         patient's neurological condition and contrast reaction. 



                                         Informed consent was obtained. Under general anesthesia provided by the 



                                         Anesthesiology service and utilizing a left femoral percutaneous approach, using

 



                                         micropuncture technique, after the diagnostic arteriogram performed by Dr. Sim Agudelo utilizing a 



                                         right jugular percutaneous approach the following procedureswere performed.

 



                                         1. Successful endovascular treatment of the right hypoglossal canal dural 



                                         arteriovenous fistula using transvenous approach and coils and [place] embolization

 



                                         material. 



                                         2. Posttreatment Selective right internal carotid injection, cerebral 



                                         examination, in biplane, obliques and magnification views. 



                                         3. Posttreatment Selective right external carotid injection, cerebral 



                                         examination, in biplane projections. 



                                         4. Posttreatment left internal carotid indication headache examination in the AP

 



                                         and oblique some indications views. 



                                         5. Post treatment left external carotid injection] hepatopedal examination in 



                                         biplane obliques and magnification views. 



                                         6. Post treatment right vertebral artery injection. Examination with been no 



                                         recent medications use. 



                                         7. Post treatment left vertebral artery injection. Examination with been no 



                                         recent medications use. 



                                         8. Post treatment Rotational angiography ([place] CT) without contrast. 



                                         FINDINGS: 



                                         After Demonstration of the right hypoglossal canal dural arteriovenous fistula 





                                         and utilizing a right jugular venousapproach an [place] 10 microcatheter was

 



                                         navigated into the right hypoglossal canal. [place] CT with contrast demonstrated 





                                         correct position 



                                         of the microcatheter. 



                                         Subsequently multiple axial coils and [place] 34 embolization material were 



                                         utilized to completelydevascularize the hypoglossal dural fistula without 



                                         complications. 



                                         The posttreatment bilateral internal carotid injection, bilaterally external 



                                         carotid andbilateral vertebral artery injection head examination demonstrate

 



                                         complete devascularization of the hypoglossal canal dural AVF without residual 





                                         arteriovenous 



                                         shunting. 



                                         The posttreatment diagnostic CT head examination demonstrated only subluxation 





                                         material at the level of the hypoglossal canal. 



                                         IMPRESSION: 



                                         1. 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 



                                         hypoglossal canal, utilizing coils and [place] embolization material. 



                                         2. Follow-up cerebral arch UM in 6 months is recommended. 



                                         3. No complications. 



                                         [facility]-[id number] 









                                        Procedure Note

 

                                        



[facility], Radiology Results Incoming - 2018  5:47 PM CDT



EXAMINATION: Embolization OF A right hypoglossal canal Dural arteriovenous 
fistula.



CLINICAL HISTORY: 62-year-old female patient with previously documented right 
hypoglossal canal dural arteriovenous fistula. The patient is here for 
endovascular treatment after diagnostic cerebral angiogram performed by Dr. Sim Agudelo.



 

RADIATION EXPOSURE: 5,844 mGy (total radiation exposure for diagnostic and 
embolization procedures).



COMPARISON: Brain CTA dated May 4, 2018; diagnostic cerebral DSA May 11, 2018.

 

CLINICAL HISTORY: 62-year-old female with right ocular pain, proptosis and 
chemosis. Patient underwent brain CTA on May 4, 2018, showing asymmetric 
enlargement of the superior ophthalmic vein on the right side and asymmetric 
filling of the right 

cavernous sinus. The patient underwent diagnostic cerebral angiography on May 
11, 2018. The patient is here today for embolization.





TECHNIQUE: 



The risk and benefits of the procedure were carefully discussed with and 
explained to the patient. The risks of the procedure include, but are not 
limited to, infection, hematoma, damage to any catheterized vessel, renal 
failure, stroke, worsening of the

 patient's neurological condition and contrast reaction. 



Informed consent was obtained. Under general anesthesia provided by the 
Anesthesiology service and utilizing a left femoral percutaneous approach, using
micropuncture technique, after the diagnostic arteriogram performed by Dr. Sim Agudelo utilizing a 

right jugular percutaneous approach the following procedures  were performed.





                                        1. Successful endovascular treatment of the right hypoglossal canal dural arteriovenous

 fistula using transvenous approach and coils and [place] embolization material.



 

                                        2. Posttreatment Selective right internal carotid injection, cerebral examination,

 in biplane, obliques and magnification views.  



                                        3. Posttreatment Selective right external carotid injection, cerebral examination,

 in biplane projections.



 

                                        4. Posttreatment left internal carotid indication headache examination in the AP

 and oblique some indications views.





                                        5. Post treatment left external carotid injection] hepatopedal examination in biplane

 obliques and magnification views.





                                        6. Post treatment right vertebral artery injection. Examination with been no recent

 medications use.



                                        7. Post treatment left vertebral artery injection. Examination with been no recent

 medications use.





                                        8. Post treatment Rotational angiography ([place] CT) without contrast.





FINDINGS:  

After Demonstration of the right hypoglossal canal dural arteriovenous fistula 
and utilizing a right jugular venous  approach an [place] 10 microcatheter was 
navigated into the right hypoglossal canal. [place] CT with contrast demonstrated 
correct position 

of the microcatheter.

Subsequently multiple axial coils and [place] 34 embolization material were 
utilized to completely  devascularize the hypoglossal dural fistula without 
complications.



The posttreatment bilateral internal carotid injection, bilaterally external 
carotid and  bilateral vertebral artery injection head examination demonstrate 
complete devascularization of the hypoglossal canal dural AVF without residual 
arteriovenous 

shunting.



The posttreatment diagnostic CT head examination demonstrated only subluxation 
material at the level of the hypoglossal canal.







IMPRESSION:  

 

                                        1. 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

 canal, utilizing coils and Beck embolization material. 



                                        2. Follow-up cerebral arch UM in 6 months is recommended.



                                        3. No complications. 









[facility]-[id number]











   



                 Performing Organization     Address         City/State/Zipcode     Phone Number

 

   



                      [facility]          [address] [place] [address] 





* IR Angiogram Cerebral (2018 12:55 PM CDT)





 



                           Narrative                 Performed At

 

 



                           CEREBRAL ARTERIOGRAM      [facility]



                                         FLUORO TIME : 10.1 Minutes 



                                         RADIATION EXPOSURE: JOYCELYN 2431 mGy 



                                         COMPARISON: none. 



                                         CLINICAL HISTORY: 62-year-old female patient with right eye proptosis and 



                                         chemosis. Patient is here for diagnostic cerebral arteriogram. 



                                         TECHNIQUE: The risk and benefits of the procedure were explained to the patient.

 



                                         The risks include but are not limited to infection, hematoma, vascular damage, 





                                         renal failure, stroke, worsening of the neurological condition and contrast 



                                         reaction. 



                                         Informed consent was obtained. Under general anesthesia provided by 



                                         Anesthesiology and utilizing a right femoral percutaneous approach, the 



                                         following procedures were performed: 



                                         Right common carotid injection neck examination in biplane projections. 



                                         Selective right internal carotid injection head examination in biplane, obliques

 



                                         and magnification views. 



                                         Right external carotid injection head examination in biplane projections. 



                                         Right external carotid injection head examination dimensional rotational views.

 



                                         Right external carotid injection head examination [place] CT. 



                                         Left common carotid injection neck examination in biplane projections. 



                                         Selective left internal carotid injection head examination in biplane, obliques

 



                                         and magnification is views. 



                                         Left external carotid injection head examination in biplane projections. 



                                         Selective left vertebral artery injection neck examination in biplane 



                                         projections. 



                                         Selective left vertebral artery injection head examination in biplane 



                                         projections. 



                                         Selective right vertebral artery injection neck examination in biplane 



                                         projections. 



                                         Selective right vertebral artery injection head examination in biplane 



                                         projections. 



                                         FINDINGS: 



                                         The right common carotid injection neck examination demonstrate normal 



                                         bifurcation without evidence of critical stenosis. There is an arteriovenous 



                                         shunting with visualization of the jugular vein and inferior petrosal sinus. 



                                         The Right internal carotid injection head examination on the right external 



                                         carotid injection head examination including the three-dimensional rotational 



                                         views demonstrate normal antegrade flow into the intracranial circulation 



                                         without evidence of focal 



                                         stenosis or aneurysms . 



                                         There is a type II dural arteriovenous fistula atthe level of the right 



                                         hypoglossal canal supplied by the neuromeningeal trunk of the right ascending 



                                         pharyngeal, superior hypophyseal artery and inferolateral trunk of the right 



                                         internal carotid, right 



                                         posterior auricular artery and artery of the foramen rotundum. 



                                         Right posterior auricular artery and artery of the foramen rotundum. The AV 



                                         shunting is at the level of the hypoglossal foramen with retrograde venous 



                                         drainage into the inferior petrosal sinus, right cavernous sinus and right 



                                         superior ophthalmic vein 



                                         with intraorbital venous hypertension. There is also atrophy drainage into the 





                                         right jugular vein. Those findings were better demonstrated on the 3-dimensional

 



                                         rotational views and [place] CT. 



                                         The left common carotid injection neck examination demonstrate normal 



                                         bifurcation without evidence of critical stenosis. 



                                         The left internal carotid injection head examination demonstrate normal 



                                         antegrade flow into the intracranial circulation. Filling of the right 



                                         hypoglossal dural arteriovenous fistula from the cavernous branches of the left

 



                                         internal carotid were 



                                         identified. 



                                         The left external carotid injection head examination demonstrate normal 



                                         branching without evidence of arteriovenous fistula. 



                                         The left vertebral artery injection neck examination demonstrate normal 



                                         vertebral artery without stenosis or dissections. 



                                         The right vertebral artery injection neck examination demonstrate normal 



                                         vertebral artery without stenosis or dissections. 



                                         The right vertebral artery injection and left vertebral artery injection head 



                                         examination demonstrate normal antegrade flow into the intracranial circulation

 



                                         without evidence of focal stenosis or aneurysms. There is filling of the right 





                                         hypoglossal 



                                         foramen arteriovenous fistula supplied by the bilateral C3 anastomotic branch of

 



                                         the vertebral arteries. 



                                          



                                         There were no complications during the procedure. At the end of the procedure 



                                         the femoral sheath was removed and hemostasis was obtained using manual 



                                         compression and Vascade device. The patient was transferred to PACU without 



                                         changes in the neurological 



                                         status. 



                                         IMPRESSION: 



                                         1. 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 



                                         2. Negative foraneurysms or arteriovenous malformations. 



                                         3. No complications. 



                                         [facility]-[id number] 









                                        Procedure Note

 

                                        



[facility], Radiology Results Incoming - 2018  5:24 PM CDT



CEREBRAL ARTERIOGRAM  

 

FLUORO TIME : 10.1 Minutes 



RADIATION EXPOSURE: JOYCELYN 2431 mGy



COMPARISON: none.

 

CLINICAL HISTORY: 62-year-old female patient with right eye proptosis and 
chemosis. Patient is here for diagnostic cerebral arteriogram.





TECHNIQUE: The risk and benefits of the procedure were explained to the patient.
The risks include but are not limited to infection, hematoma, vascular damage, 
renal failure, stroke, worsening of the neurological condition and contrast 
reaction. 

Informed consent was obtained. Under general anesthesia provided by 
Anesthesiology and utilizing a right femoral percutaneous approach, the 
following procedures were performed:   



Right common carotid injection neck examination in biplane projections.

 

Selective right internal carotid injection head examination in biplane, obliques
and magnification views.  



Right external carotid injection head examination in biplane projections.



Right external carotid injection head examination dimensional rotational views.



Right external carotid injection head examination [place] CT.



Left common carotid injection neck examination in biplane projections.

 

Selective left internal carotid injection head examination in biplane, obliques 
and magnification is views.  



Left external carotid injection head examination in biplane projections.

 

Selective left vertebral artery injection neck examination in biplane 
projections.  



Selective left vertebral artery injection head examination in biplane 
projections.



Selective right vertebral artery injection neck examination in biplane 
projections.  



Selective right vertebral artery injection head examination in biplane 
projections.

 

FINDINGS:  

The right common carotid injection neck examination demonstrate normal 
bifurcation without evidence of critical stenosis. There is an arteriovenous 
shunting with visualization of the jugular vein and inferior petrosal sinus.



The Right internal carotid injection head examination on the right external 
carotid injection head examination including the three-dimensional rotational 
views demonstrate normal antegrade flow into the intracranial circulation 
without evidence of focal 

stenosis or aneurysms .  



There is a type II dural arteriovenous fistula at  the level of the right 
hypoglossal canal supplied by the neuromeningeal trunk of the right ascending 
pharyngeal, superior hypophyseal artery and inferolateral trunk of the right 
internal carotid, right 

posterior auricular artery and artery of the foramen rotundum. 

 Right posterior auricular artery and artery of the foramen rotundum. The AV 
shunting is at the level of the hypoglossal foramen with retrograde venous 
drainage into the inferior petrosal sinus, right cavernous sinus and right 
superior ophthalmic vein 

with intraorbital venous hypertension. There is also atrophy drainage into the 
right jugular vein. Those findings were better demonstrated on the 3-dimensional
rotational views and [place] CT.



The left common carotid injection neck examination demonstrate normal 
bifurcation without evidence of critical stenosis.



The left internal carotid injection head examination demonstrate normal 
antegrade flow into the intracranial circulation. Filling of the right 
hypoglossal dural arteriovenous fistula from the cavernous branches of the left 
internal carotid were 

identified.



The left external carotid injection head examination demonstrate normal 
branching without evidence of arteriovenous fistula.



The left vertebral artery injection neck examination demonstrate normal 
vertebral artery without stenosis or dissections. 



The right vertebral artery injection neck examination demonstrate normal 
vertebral artery without stenosis or dissections.  



The right vertebral artery injection and left vertebral artery injection head 
examination demonstrate normal antegrade flow into the intracranial circulation 
without evidence of focal stenosis or aneurysms. There is filling of the right 
hypoglossal 

foramen arteriovenous fistula supplied by the bilateral C3 anastomotic branch of
the vertebral arteries.





    

There were no complications during the procedure. At the end of the procedure 
the femoral sheath was removed and hemostasis was obtained using manual 
compression and Vascade device. The patient was transferred to PACU without 
changes in the neurological 

status. 

 

IMPRESSION:  

 

 1. 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 

 

 2. Negative for  aneurysms or arteriovenous malformations.  

 

 3. No complications.   

 



[facility]-[id number]











   



                 Performing Organization     Address         City/State/Zipcode     Phone Number

 

   



                     [facility]          [address] [place] [address] 





* CTA Head W Wo Contrast (2018  6:00 PM CDT)





 



                           Narrative                 Performed At

 

 



                           EXAMINATION: CT ANGIOGRAM HEAD W WO CONTRAST      [facility]



                                         CLINICAL HISTORY: H57.11 Ocular painright eye, H57.11 



                                         COMPARISON:None 



                                         TECHNIQUE:Imaging of the intracranial circulation was obtained from the 



                                         skull base to the vertex during the arterial phase of enhancement. 



                                         Postprocessing was performed with MIP multiplanar and 3D reconstructed 



                                         images.CT imaging was performed with 



                                         iterative reconstruction technique and/or automated exposure control to reduce 





                                         radiation dose. 



                                         FINDINGS: 



                                         Moderate right-sided proptosis, asymmetric enlargement of the right superior 



                                         ophthalmic vein, and asymmetric filling of the right cavernous sinus. Mild 



                                         periorbital and retro-orbital fat stranding. Stretching of the right optic 



                                         nerve. 



                                         The major branches of the anterior and posterior arterial circulations of the 



                                         brain appear patent without evidence of focal stenosis or aneurysm. Mild 



                                         arteriosclerosis of the V4 segments of the vertebral arteries and cavernous and

 



                                         paraclinoid internal 



                                         carotid arteries without significant stenosis. Major venous sinuses appear 



                                         patent. 



                                         IMPRESSION: 



                                         Moderate right-sided proptosis with stretching of the right optic nerve and 



                                         periorbital and retro-orbital fat stranding, asymmetric enlargement of the right

 



                                         superior ophthalmic vein, and asymmetric filling of the right cavernous sinus 



                                         concerning for 



                                         right carotid cavernous sinus fistula. Conventional angiography could be 



                                         performed for further evaluation and treatment if necessary. 



                                         Confirmation of report receipt will be tracked in Epic. 



                                         [id number] 









                                        Procedure Note

 

                                        



 Interface, Radiology Results Incoming - 2018  6:18 PM CDT



EXAMINATION: CT ANGIOGRAM HEAD W WO CONTRAST



CLINICAL HISTORY: H57.11 Ocular pain  right eye, H57.11



COMPARISON:  None



TECHNIQUE:  Imaging of the intracranial circulation was obtained from the skull 
base to the vertex during the arterial phase of enhancement. Postprocessing was 
performed with MIP multiplanar and 3D reconstructed images.  CT imaging was 
performed with 

iterative reconstruction technique and/or automated exposure control to reduce 
radiation dose.





FINDINGS:



Moderate right-sided proptosis, asymmetric enlargement of the right superior 
ophthalmic vein, and asymmetric filling of the right cavernous sinus. Mild 
periorbital and retro-orbital fat stranding. Stretching of the right optic 
nerve.



The major branches of the anterior and posterior arterial circulations of the 
brain appear patent without evidence of focal stenosis or aneurysm. Mild 
arteriosclerosis of the V4 segments of the vertebral arteries and cavernous and 
paraclinoid internal 

carotid arteries without significant stenosis. Major venous sinuses appear 
patent.



IMPRESSION:

Moderate right-sided proptosis with stretching of the right optic nerve and 
periorbital and retro-orbital fat stranding, asymmetric enlargement of the right
superior ophthalmic vein, and asymmetric filling of the right cavernous sinus 
concerning for 

right carotid cavernous sinus fistula. Conventional angiography could be 
performed for further evaluation and treatment if necessary.





Confirmation of report receipt will be tracked in Epic.





[id number]











   



                 Performing Organization     Address         City/[facility]/Zipcode     Phone Number

 

   



                     [facility]          [address] [place] [address] 





* POC creatinine (2018  5:33 PM CDT)





   



                 Component       Value           Ref Range       Performed At

 

   



                 POC creatinine     0.9             0.5 - 0.9 mg/dl     [facility] DEPARTMENT OF



                                         PATHOLOGY AND



                                         GENOMIC MEDICINE













                                         Specimen

 





                                         Blood









   



                 Performing Organization     Address         City/State/Zipcode     Phone Number

 

   



                     [address] [place] [address] 



                                         PATHOLOGY AND GENOMIC   



                                         MEDICINE   





after 2018



Insurance







     



            Payer      Benefit     Subscriber ID     Type       Phone      Address



                                         Plan /    



                                         Group    

 

     



                 [employer]            [employer]            xxxxxxxxxxxx     PPO  



                                         CHOICE    



                                         PPO/[employer] PPO    









     



            Guarantor Name     Account     Relation to     Date of     Phone      Billing Address



                     Type                Patient             Birth  

 

     



            Dipti Law     Personal/F     Self       1955     [phone] [address] [facility]               ([address])              [place] [address]







Advance Directives





Patient has advance care planning documents on file. For more information, [employer] [employer] contact:



Tamir Larsen



[address] [place] [address]